# Patient Record
Sex: FEMALE | Race: BLACK OR AFRICAN AMERICAN | NOT HISPANIC OR LATINO | Employment: UNEMPLOYED | URBAN - METROPOLITAN AREA
[De-identification: names, ages, dates, MRNs, and addresses within clinical notes are randomized per-mention and may not be internally consistent; named-entity substitution may affect disease eponyms.]

---

## 2017-01-03 ENCOUNTER — HOSPITAL ENCOUNTER (EMERGENCY)
Facility: OTHER | Age: 37
Discharge: PSYCHIATRIC OR SUBSTANCE USE FACILITY | End: 2017-01-03
Attending: EMERGENCY MEDICINE
Payer: MEDICAID

## 2017-01-03 VITALS
WEIGHT: 140 LBS | SYSTOLIC BLOOD PRESSURE: 127 MMHG | RESPIRATION RATE: 12 BRPM | TEMPERATURE: 98 F | OXYGEN SATURATION: 99 % | HEIGHT: 66 IN | BODY MASS INDEX: 22.5 KG/M2 | HEART RATE: 82 BPM | DIASTOLIC BLOOD PRESSURE: 82 MMHG

## 2017-01-03 DIAGNOSIS — F19.10 POLYSUBSTANCE ABUSE: ICD-10-CM

## 2017-01-03 DIAGNOSIS — F32.A DEPRESSION, UNSPECIFIED DEPRESSION TYPE: ICD-10-CM

## 2017-01-03 DIAGNOSIS — R45.851 SUICIDAL IDEATION: Primary | ICD-10-CM

## 2017-01-03 LAB
ALBUMIN SERPL BCP-MCNC: 4 G/DL
ALP SERPL-CCNC: 107 U/L
ALT SERPL W/O P-5'-P-CCNC: 17 U/L
AMPHET+METHAMPHET UR QL: NEGATIVE
ANION GAP SERPL CALC-SCNC: 12 MMOL/L
APAP SERPL-MCNC: <3 UG/ML
AST SERPL-CCNC: 31 U/L
BACTERIA #/AREA URNS HPF: NORMAL /HPF
BARBITURATES UR QL SCN>200 NG/ML: NEGATIVE
BASOPHILS # BLD AUTO: 0.02 K/UL
BASOPHILS NFR BLD: 0.5 %
BENZODIAZ UR QL SCN>200 NG/ML: NEGATIVE
BILIRUB SERPL-MCNC: 0.2 MG/DL
BILIRUB UR QL STRIP: NEGATIVE
BUN SERPL-MCNC: 18 MG/DL
BZE UR QL SCN: NORMAL
CALCIUM SERPL-MCNC: 9.3 MG/DL
CANNABINOIDS UR QL SCN: NEGATIVE
CHLORIDE SERPL-SCNC: 106 MMOL/L
CLARITY UR: CLEAR
CO2 SERPL-SCNC: 20 MMOL/L
COLOR UR: YELLOW
CREAT SERPL-MCNC: 1 MG/DL
CREAT UR-MCNC: 225.5 MG/DL
DIFFERENTIAL METHOD: ABNORMAL
EOSINOPHIL # BLD AUTO: 0.2 K/UL
EOSINOPHIL NFR BLD: 4.7 %
ERYTHROCYTE [DISTWIDTH] IN BLOOD BY AUTOMATED COUNT: 17 %
EST. GFR  (AFRICAN AMERICAN): >60 ML/MIN/1.73 M^2
EST. GFR  (NON AFRICAN AMERICAN): >60 ML/MIN/1.73 M^2
ETHANOL SERPL-MCNC: <10 MG/DL
GLUCOSE SERPL-MCNC: 69 MG/DL
GLUCOSE UR QL STRIP: NEGATIVE
HCT VFR BLD AUTO: 36.2 %
HGB BLD-MCNC: 11.6 G/DL
HGB UR QL STRIP: NEGATIVE
HYALINE CASTS #/AREA URNS LPF: 0 /LPF
KETONES UR QL STRIP: NEGATIVE
LEUKOCYTE ESTERASE UR QL STRIP: NEGATIVE
LYMPHOCYTES # BLD AUTO: 1.2 K/UL
LYMPHOCYTES NFR BLD: 28.8 %
MCH RBC QN AUTO: 26.7 PG
MCHC RBC AUTO-ENTMCNC: 32 %
MCV RBC AUTO: 83 FL
METHADONE UR QL SCN>300 NG/ML: NEGATIVE
MICROSCOPIC COMMENT: NORMAL
MONOCYTES # BLD AUTO: 0.4 K/UL
MONOCYTES NFR BLD: 9.7 %
NEUTROPHILS # BLD AUTO: 2.3 K/UL
NEUTROPHILS NFR BLD: 56.3 %
NITRITE UR QL STRIP: NEGATIVE
OPIATES UR QL SCN: NORMAL
PCP UR QL SCN>25 NG/ML: NEGATIVE
PH UR STRIP: 6 [PH] (ref 5–8)
PLATELET # BLD AUTO: 166 K/UL
PMV BLD AUTO: 9.8 FL
POCT GLUCOSE: 100 MG/DL (ref 70–110)
POTASSIUM SERPL-SCNC: 3.9 MMOL/L
PROT SERPL-MCNC: 8.8 G/DL
PROT UR QL STRIP: ABNORMAL
RBC # BLD AUTO: 4.34 M/UL
RBC #/AREA URNS HPF: 1 /HPF (ref 0–4)
SODIUM SERPL-SCNC: 138 MMOL/L
SP GR UR STRIP: >=1.03 (ref 1–1.03)
SQUAMOUS #/AREA URNS HPF: 15 /HPF
TOXICOLOGY INFORMATION: NORMAL
TSH SERPL DL<=0.005 MIU/L-ACNC: 1.8 UIU/ML
URN SPEC COLLECT METH UR: ABNORMAL
UROBILINOGEN UR STRIP-ACNC: NEGATIVE EU/DL
WBC # BLD AUTO: 4.03 K/UL
WBC #/AREA URNS HPF: 4 /HPF (ref 0–5)

## 2017-01-03 PROCEDURE — 80053 COMPREHEN METABOLIC PANEL: CPT

## 2017-01-03 PROCEDURE — 80320 DRUG SCREEN QUANTALCOHOLS: CPT

## 2017-01-03 PROCEDURE — 96374 THER/PROPH/DIAG INJ IV PUSH: CPT

## 2017-01-03 PROCEDURE — 25000003 PHARM REV CODE 250: Performed by: EMERGENCY MEDICINE

## 2017-01-03 PROCEDURE — 99285 EMERGENCY DEPT VISIT HI MDM: CPT | Mod: 25

## 2017-01-03 PROCEDURE — 81000 URINALYSIS NONAUTO W/SCOPE: CPT

## 2017-01-03 PROCEDURE — 63600175 PHARM REV CODE 636 W HCPCS: Performed by: EMERGENCY MEDICINE

## 2017-01-03 PROCEDURE — 96372 THER/PROPH/DIAG INJ SC/IM: CPT

## 2017-01-03 PROCEDURE — 82962 GLUCOSE BLOOD TEST: CPT

## 2017-01-03 PROCEDURE — 84443 ASSAY THYROID STIM HORMONE: CPT

## 2017-01-03 PROCEDURE — 25000003 PHARM REV CODE 250: Performed by: PSYCHIATRY & NEUROLOGY

## 2017-01-03 PROCEDURE — 82570 ASSAY OF URINE CREATININE: CPT

## 2017-01-03 PROCEDURE — 85025 COMPLETE CBC W/AUTO DIFF WBC: CPT

## 2017-01-03 PROCEDURE — 80329 ANALGESICS NON-OPIOID 1 OR 2: CPT

## 2017-01-03 RX ORDER — DIPHENHYDRAMINE HYDROCHLORIDE 50 MG/ML
50 INJECTION INTRAMUSCULAR; INTRAVENOUS
Status: COMPLETED | OUTPATIENT
Start: 2017-01-03 | End: 2017-01-03

## 2017-01-03 RX ORDER — LORAZEPAM 2 MG/ML
1 INJECTION INTRAMUSCULAR
Status: COMPLETED | OUTPATIENT
Start: 2017-01-03 | End: 2017-01-03

## 2017-01-03 RX ORDER — HYDROCORTISONE 1 %
CREAM (GRAM) TOPICAL 2 TIMES DAILY
Status: ON HOLD | COMMUNITY
End: 2022-05-29

## 2017-01-03 RX ORDER — SERTRALINE HYDROCHLORIDE 25 MG/1
25 TABLET, FILM COATED ORAL DAILY
Status: DISCONTINUED | OUTPATIENT
Start: 2017-01-03 | End: 2017-01-03 | Stop reason: HOSPADM

## 2017-01-03 RX ORDER — HALOPERIDOL 5 MG/ML
5 INJECTION INTRAMUSCULAR
Status: COMPLETED | OUTPATIENT
Start: 2017-01-03 | End: 2017-01-03

## 2017-01-03 RX ORDER — KETOCONAZOLE 20 MG/G
CREAM TOPICAL DAILY PRN
COMMUNITY

## 2017-01-03 RX ADMIN — DEXTROSE MONOHYDRATE 12.5 G: 25 INJECTION, SOLUTION INTRAVENOUS at 04:01

## 2017-01-03 RX ADMIN — LORAZEPAM 1 MG: 2 INJECTION INTRAMUSCULAR; INTRAVENOUS at 03:01

## 2017-01-03 RX ADMIN — HALOPERIDOL LACTATE 5 MG: 5 INJECTION, SOLUTION INTRAMUSCULAR at 02:01

## 2017-01-03 RX ADMIN — DIPHENHYDRAMINE HYDROCHLORIDE 50 MG: 50 INJECTION, SOLUTION INTRAMUSCULAR; INTRAVENOUS at 03:01

## 2017-01-03 RX ADMIN — SERTRALINE HYDROCHLORIDE 25 MG: 25 TABLET ORAL at 10:01

## 2017-01-03 NOTE — ED PROVIDER NOTES
"Encounter Date: 1/3/2017    SCRIBE #1 NOTE: I, Rosales Felipe, am scribing for, and in the presence of, Dr. Shaver.       History     Chief Complaint   Patient presents with    Altered Mental Status     did crack and heroin for several days and stated she is trying to kill herself     Review of patient's allergies indicates:   Allergen Reactions    Iodinated contrast media - iv dye Hives and Itching     HPI Comments: Time seen by provider: 1:59 AM    This is a 36 y.o. female who presents to the ED per EMS for altered mental status. EMS reports she admitted to 3 days of heroin and crack cocaine use. She states that she wants to hurt herself with overdosing on the heroin and cocaine as she "is tired of living and of people." Hx of HIV reported. She reports she is homeless. The patient's hx is limited by her uncooperativeness and combativeness.     The history is provided by the EMS personnel and the patient.     Past Medical History   Diagnosis Date    AIDS     HIV (human immunodeficiency virus infection)      No past medical history pertinent negatives.  Past Surgical History   Procedure Laterality Date     section      Hysterectomy       History reviewed. No pertinent family history.  Social History   Substance Use Topics    Smoking status: Current Every Day Smoker    Smokeless tobacco: None    Alcohol use No     Review of Systems   Reason unable to perform ROS: Patient is uncooperative.       Physical Exam   Initial Vitals   BP Pulse Resp Temp SpO2   17 0150 17 0150 -- 17 0150 17 0150   137/94 80  97.7 °F (36.5 °C) 99 %     Physical Exam    Nursing note and vitals reviewed.  Constitutional: She appears well-developed and well-nourished. She is not diaphoretic. She appears distressed.   HENT:   Head: Normocephalic and atraumatic.   Right Ear: External ear normal.   Left Ear: External ear normal.   Nose: Nose normal.   Mouth/Throat: Oropharynx is clear and moist.   Eyes: " Conjunctivae and EOM are normal. Pupils are equal, round, and reactive to light. Right eye exhibits no discharge. Left eye exhibits no discharge.   Neck: Normal range of motion.   Cardiovascular: Normal rate, regular rhythm and normal heart sounds. Exam reveals no gallop and no friction rub.    No murmur heard.  Pulmonary/Chest: Breath sounds normal. No respiratory distress. She has no wheezes. She has no rhonchi. She has no rales.   Abdominal: Soft. There is no tenderness. There is no rebound and no guarding.   Musculoskeletal: Normal range of motion. She exhibits no edema or tenderness.   Neurological: She is alert and oriented to person, place, and time.   Skin: Skin is warm and dry. No rash and no abscess noted. No erythema. No pallor.   Psychiatric: Her speech is normal. Her affect is labile. She is agitated and combative. Cognition and memory are normal. She expresses suicidal ideation. She expresses suicidal plans.   Tearful.          ED Course   Critical Care  Date/Time: 1/9/2017 4:06 PM  Performed by: MAGDIEL THOMPSON  Authorized by: MAGDIEL THOMPSON   Direct patient critical care time: 16 minutes  Additional history critical care time: 4 minutes  Ordering / reviewing critical care time: 5 minutes  Documentation critical care time: 5 minutes  Consult with family critical care time: 4 (PEC filing) minutes  Total critical care time (exclusive of procedural time) : 34 minutes  Critical care time was exclusive of separately billable procedures and treating other patients.  Critical care was necessary to treat or prevent imminent or life-threatening deterioration of the following conditions: CNS failure or compromise and toxidrome.  Critical care was time spent personally by me on the following activities: blood draw for specimens, obtaining history from patient or surrogate, pulse oximetry, re-evaluation of patient's condition, ordering and performing treatments and interventions, interpretation of cardiac  output measurements, development of treatment plan with patient or surrogate, evaluation of patient's response to treatment, ordering and review of laboratory studies, review of old charts and examination of patient.        Labs Reviewed   CBC W/ AUTO DIFFERENTIAL - Abnormal; Notable for the following:        Result Value    Hemoglobin 11.6 (*)     Hematocrit 36.2 (*)     MCH 26.7 (*)     RDW 17.0 (*)     All other components within normal limits   COMPREHENSIVE METABOLIC PANEL - Abnormal; Notable for the following:     CO2 20 (*)     Glucose 69 (*)     Total Protein 8.8 (*)     All other components within normal limits   URINALYSIS - Abnormal; Notable for the following:     Specific Gravity, UA >=1.030 (*)     Protein, UA 1+ (*)     All other components within normal limits   ACETAMINOPHEN LEVEL - Abnormal; Notable for the following:     Acetaminophen (Tylenol), Serum <3.0 (*)     All other components within normal limits   TSH   DRUG SCREEN PANEL, URINE EMERGENCY   ALCOHOL,MEDICAL (ETHANOL)   URINALYSIS MICROSCOPIC   POCT GLUCOSE             Medical Decision Making:   ED Management:  Emergent evaluation of 36-year-old female with altered mental status, reported suicidal ideation and depression, history of polysubstance abuse.  On exam she admits to wanting to kill herself, but is uncooperative and agitated.  She reports required chemical and physical restraints.  PEC was filed, and she was observed until sober and medically cleared for evaluation by psychiatry.            Scribe Attestation:   Scribe #1: I performed the above scribed service and the documentation accurately describes the services I performed. I attest to the accuracy of the note.    Attending Attestation:           Physician Attestation for Scribe:  Physician Attestation Statement for Scribe #1: I, Dr. Shaver, reviewed documentation, as scribed by Rosales Wang in my presence, and it is both accurate and complete.                 ED Course      Clinical Impression:     1. Suicidal ideation    2. Polysubstance abuse    3. Depression, unspecified depression type                Bernadine Shaver MD  01/09/17 [id number]

## 2017-01-03 NOTE — ED NOTES
Rounding on the patient has been done. The patient has been updated on the plan of care and current status. Pain was assessed and is currently a 0/10. Comfort positioning and restroom needs were addressed. Necessary items were placed with in reach and was advised when a reassessment would take place. The call bell remains at the bedside for any additional patient needs. The patient is resting comfortably on the stretcher, respirations are even and unlabored, skin warm and dry. Will continue to monitor.

## 2017-01-03 NOTE — PSYCH
DATE OF CONSULTATION:  01/03/2017    IDENTIFICATION DATA:  This is a 36-year-old  -American female who   was brought to ER by EMS after overdose on heroin and suicidal thoughts.  This   consult is requested by Dr. Shaver for psych evaluation.  The patient is on a   PEC status.    CHIEF COMPLAINT:  The patient is foggy and did not answer questions.  She has    with cord around her neck.    HISTORY OF PRESENTING ILLNESS:  According to intake note, the patient had change   in mental status and was using heroin and cocaine.  Her urine was positive for   heroin and cocaine.  She was combative, labile, agitated and was statihng that   she wants to kill herself by overdose on heroin and cocaine.  The patient has   been to outpatient wards in December 2016 after overdose on heroin.  She was in   respiratory distress and required Narcan.  The patient was given Narcan this   time also.  The patient was combative, agitated and was uncooperative.  She   required restraints and safety measures.  She tried to wrap a telephone cord   around her neck.  The patient was given Haldol, Ativan last night.  The patient   is still sedated.  The patient is able to open her eyes.  She has goosebumps at   this time, but no nausea or vomiting.  The patient said yes to use of heroin and   cocaine, but was not able to elaborate.  The patient said yes to Suboxone when   question was asked whether she needs Suboxone.  The patient's chart indicates   that she had IV heroin and cocaine 2 hours prior to arrival, she arrived around   1:30 a.m.  The patient's urine drug screen is positive for cocaine and heroin.    She is homeless and doing prostitution.  During her last visit, staff found   condoms and paraphernalia in her purse.  Her alcohol level was 340 at the time   of last admission.  Her alcohol level at this time is less than 10.  The patient   denies hearing voices or seeing things.  The patient toxic psychosis.  She   has a  conflictual relationship with  who does not allow her to come close   to kids because of her addiction problem and she knows about that.  Her friend   did not answer Dr. Santana's call.  The patient told staff that the last time   that she is not taking her HIV medicine for 2 or 3 years because of her   addiction.  The patient has family problems.  She has financial difficulties and   trouble with housing.    PAST PSYCHIATRIC HISTORY:  Chart indicates that the patient had been to [facility] in the past for her addiction.  She is not on any psychotropic medicine.    She had accidental overdose on heroin in the past.  Little is known about her   rehabilitation and legal issues.    MEDICAL HISTORY:  The patient has HIV infection.  Her blood pressure is 137/94   with 80 pulse.  Her WBC is 4.0, hemoglobin 11.6, hematocrit 36, platelets 166,   MCV 83 and lymphos 28.  Her sodium is 134, potassium 3.9.  Liver enzymes are   normal.    ALLERGIES:  SHE IS ALLERGIC TO IODINE.    She was on Retrovir, Prezista, Zithromax, Bactrim and Percocet.  Her UA is   positive for cocaine and heroin.    MENTAL STATUS EXAMINATION:  This is a 36-year-old tall -American female   who looks disheveled, exhibited poor eye contact.  She is able to open her eyes   and verbalize words, which indicates that the patient is able to comprehend and   communicate.  .  Mood is depressed with sad affect.  She has   a blister on her lip.  No tremors noted.  However, she has goosebumps.  No   nausea or vomiting noted.    She has no intention to harm   herself or others.  She verbalized wanted to kill herself.  She was   communicative.  Insight and judgment are impaired.  She is of average   intelligence person.    PSYCHIATRIC DIAGNOSES:  AXIS I:  Depressive disorder, NOS, cocaine dependence, alcohol dependence.  AXIS II:  No diagnosis.  AXIS III:  HIV.  AXIS IV:  Homeless, alcoholic, family problem, drug problem.  AXIS V:   35.    RECOMMENDATIONS:  1.  We will transfer this patient to [facility] for further   stabilization.  2.  We will initiate Zyprexa 10 mg p.o.   3.  We will restart medicine at the [facility].    PROGNOSIS:  Fair.    ESTIMATED LENGTH OF STAY:  5 days.    CRITERIA FOR DISCHARGE:  The patient will show improvement in depression,   suicidal ideation, and safety     ASSETS:  The patient is verbal, communicative, intelligent and motivated.    /hjr [id number] blank(s)/review Audio muffled, unable to be transcribed.      WILIAM  dd: 01/03/2017 08:58:56 (CST)  td: 01/03/2017 10:53:59 (CST)  Doc ID   #[id number]  Job ID #[id number]    CC: Bernadine Shaver M.D.  [facility]

## 2017-01-03 NOTE — ED NOTES
Pt transferred to room 5 without incident, respirations even and unlabored, arouses to voice, Pt denies pain as well as auditory, and visual hallucinations. Sates she continues to have suicidal thoughts.

## 2017-01-03 NOTE — ED NOTES
Pt resting in bed calmly with eyes closed, RR easy non labored, NAD. Negative further complaints at this time, sitter at bedside, will continue to monitor for changes

## 2017-01-03 NOTE — ED NOTES
Pt lying in bed with eyes closed, respirations even and unlabored, arouses to voice. Pt updated on POC and agrees, sitter posted will continue to monitor.

## 2017-01-03 NOTE — ED NOTES
"Pt presented to ED via [facility] with complaints of SI via OD of heroine and crack cocaine with last use approx 2 hours pta per pt. On arrival pt appears disheveled with dirt covered clothing stating "I been homeless living on the streets and have been having thoughts of killing myself". RR easy non labored, NAD. Negative complaints of pain at this time, AAOX4, pt placed into paper scrubs with sitter at bedside, side rails up x2, call light within reach, bed in lowest locked position, will continue to monitor for changes   "

## 2017-01-03 NOTE — ED NOTES
"Pt becomes verbally abusive to staff stating "I dont want to be here yall arent treating me right", pt informed that we are giving her the best care possible but the pt would have to cooperate with staff to complete PEC process, pt begins to become very loud and aggravated removing phone from wall stating "I just want to leave now", phone removed from pt and removed from room, pt becomes even more combative with staff screaming "let me go now", attempted to deescalate the situation with failure, pt spits at staff at this time. Security called to bedside for assistance, pt is deescalated at this time       "

## 2021-12-29 ENCOUNTER — HOSPITAL ENCOUNTER (EMERGENCY)
Facility: HOSPITAL | Age: 41
Discharge: LEFT BEFORE DISCHARGE | End: 2021-12-29
Attending: EMERGENCY MEDICINE
Payer: MEDICAID

## 2021-12-29 VITALS
SYSTOLIC BLOOD PRESSURE: 132 MMHG | HEART RATE: 88 BPM | WEIGHT: 273 LBS | TEMPERATURE: 99 F | BODY MASS INDEX: 39.08 KG/M2 | DIASTOLIC BLOOD PRESSURE: 86 MMHG | OXYGEN SATURATION: 99 % | RESPIRATION RATE: 16 BRPM | HEIGHT: 70 IN

## 2021-12-29 DIAGNOSIS — Z20.822 LAB TEST NEGATIVE FOR COVID-19 VIRUS: Primary | ICD-10-CM

## 2021-12-29 LAB
CTP QC/QA: YES
INFLUENZA A, MOLECULAR: NEGATIVE
INFLUENZA B, MOLECULAR: NEGATIVE
SARS-COV-2 RDRP RESP QL NAA+PROBE: NEGATIVE
SPECIMEN SOURCE: NORMAL

## 2021-12-29 PROCEDURE — U0002 COVID-19 LAB TEST NON-CDC: HCPCS | Performed by: EMERGENCY MEDICINE

## 2021-12-29 PROCEDURE — 99282 EMERGENCY DEPT VISIT SF MDM: CPT | Mod: 25

## 2021-12-29 PROCEDURE — 87502 INFLUENZA DNA AMP PROBE: CPT | Performed by: EMERGENCY MEDICINE

## 2022-05-28 ENCOUNTER — HOSPITAL ENCOUNTER (OUTPATIENT)
Facility: OTHER | Age: 42
Discharge: HOME | End: 2022-05-30
Attending: EMERGENCY MEDICINE | Admitting: INTERNAL MEDICINE
Payer: MEDICAID

## 2022-05-28 DIAGNOSIS — N39.0 URINARY TRACT INFECTION WITH HEMATURIA, SITE UNSPECIFIED: ICD-10-CM

## 2022-05-28 DIAGNOSIS — R13.10 DYSPHAGIA: Primary | ICD-10-CM

## 2022-05-28 DIAGNOSIS — R31.9 URINARY TRACT INFECTION WITH HEMATURIA, SITE UNSPECIFIED: ICD-10-CM

## 2022-05-28 LAB
ALBUMIN SERPL BCP-MCNC: 3.6 G/DL (ref 3.5–5.2)
ALP SERPL-CCNC: 195 U/L (ref 55–135)
ALT SERPL W/O P-5'-P-CCNC: 65 U/L (ref 10–44)
ANION GAP SERPL CALC-SCNC: 14 MMOL/L (ref 8–16)
AST SERPL-CCNC: 70 U/L (ref 10–40)
BACTERIA #/AREA URNS HPF: ABNORMAL /HPF
BASOPHILS # BLD AUTO: 0.04 K/UL (ref 0–0.2)
BASOPHILS NFR BLD: 0.5 % (ref 0–1.9)
BILIRUB SERPL-MCNC: 0.5 MG/DL (ref 0.1–1)
BILIRUB UR QL STRIP: NEGATIVE
BUN SERPL-MCNC: 13 MG/DL (ref 6–20)
CALCIUM SERPL-MCNC: 9.5 MG/DL (ref 8.7–10.5)
CHLORIDE SERPL-SCNC: 108 MMOL/L (ref 95–110)
CLARITY UR: ABNORMAL
CO2 SERPL-SCNC: 20 MMOL/L (ref 23–29)
COLOR UR: YELLOW
CREAT SERPL-MCNC: 1 MG/DL (ref 0.5–1.4)
CTP QC/QA: YES
DIFFERENTIAL METHOD: ABNORMAL
EOSINOPHIL # BLD AUTO: 0.2 K/UL (ref 0–0.5)
EOSINOPHIL NFR BLD: 2.2 % (ref 0–8)
ERYTHROCYTE [DISTWIDTH] IN BLOOD BY AUTOMATED COUNT: 15.8 % (ref 11.5–14.5)
EST. GFR  (AFRICAN AMERICAN): >60 ML/MIN/1.73 M^2
EST. GFR  (NON AFRICAN AMERICAN): >60 ML/MIN/1.73 M^2
GLUCOSE SERPL-MCNC: 91 MG/DL (ref 70–110)
GLUCOSE UR QL STRIP: NEGATIVE
HCT VFR BLD AUTO: 42.9 % (ref 37–48.5)
HGB BLD-MCNC: 13.9 G/DL (ref 12–16)
HGB UR QL STRIP: ABNORMAL
IMM GRANULOCYTES # BLD AUTO: 0.06 K/UL (ref 0–0.04)
IMM GRANULOCYTES NFR BLD AUTO: 0.8 % (ref 0–0.5)
KETONES UR QL STRIP: NEGATIVE
LEUKOCYTE ESTERASE UR QL STRIP: ABNORMAL
LIPASE SERPL-CCNC: 22 U/L (ref 4–60)
LYMPHOCYTES # BLD AUTO: 3 K/UL (ref 1–4.8)
LYMPHOCYTES NFR BLD: 38.7 % (ref 18–48)
MCH RBC QN AUTO: 30.5 PG (ref 27–31)
MCHC RBC AUTO-ENTMCNC: 32.4 G/DL (ref 32–36)
MCV RBC AUTO: 94 FL (ref 82–98)
MICROSCOPIC COMMENT: ABNORMAL
MONOCYTES # BLD AUTO: 0.8 K/UL (ref 0.3–1)
MONOCYTES NFR BLD: 10 % (ref 4–15)
NEUTROPHILS # BLD AUTO: 3.7 K/UL (ref 1.8–7.7)
NEUTROPHILS NFR BLD: 47.8 % (ref 38–73)
NITRITE UR QL STRIP: POSITIVE
NRBC BLD-RTO: 0 /100 WBC
PH UR STRIP: 8 [PH] (ref 5–8)
PLATELET # BLD AUTO: 234 K/UL (ref 150–450)
PMV BLD AUTO: 9.6 FL (ref 9.2–12.9)
POTASSIUM SERPL-SCNC: 4.7 MMOL/L (ref 3.5–5.1)
PROT SERPL-MCNC: 7.9 G/DL (ref 6–8.4)
PROT UR QL STRIP: NEGATIVE
RBC # BLD AUTO: 4.56 M/UL (ref 4–5.4)
RBC #/AREA URNS HPF: 8 /HPF (ref 0–4)
SARS-COV-2 RDRP RESP QL NAA+PROBE: NEGATIVE
SODIUM SERPL-SCNC: 142 MMOL/L (ref 136–145)
SP GR UR STRIP: 1.02 (ref 1–1.03)
SQUAMOUS #/AREA URNS HPF: 12 /HPF
TSH SERPL DL<=0.005 MIU/L-ACNC: 0.95 UIU/ML (ref 0.4–4)
URN SPEC COLLECT METH UR: ABNORMAL
UROBILINOGEN UR STRIP-ACNC: NEGATIVE EU/DL
WBC # BLD AUTO: 7.78 K/UL (ref 3.9–12.7)
WBC #/AREA URNS HPF: 20 /HPF (ref 0–5)

## 2022-05-28 PROCEDURE — 99285 EMERGENCY DEPT VISIT HI MDM: CPT | Mod: 25

## 2022-05-28 PROCEDURE — 25000003 PHARM REV CODE 250: Performed by: PHYSICIAN ASSISTANT

## 2022-05-28 PROCEDURE — 87186 SC STD MICRODIL/AGAR DIL: CPT | Performed by: PHYSICIAN ASSISTANT

## 2022-05-28 PROCEDURE — 93010 EKG 12-LEAD: ICD-10-PCS | Mod: ,,, | Performed by: INTERNAL MEDICINE

## 2022-05-28 PROCEDURE — U0002 COVID-19 LAB TEST NON-CDC: HCPCS | Performed by: PHYSICIAN ASSISTANT

## 2022-05-28 PROCEDURE — 93005 ELECTROCARDIOGRAM TRACING: CPT

## 2022-05-28 PROCEDURE — G0378 HOSPITAL OBSERVATION PER HR: HCPCS

## 2022-05-28 PROCEDURE — 96361 HYDRATE IV INFUSION ADD-ON: CPT

## 2022-05-28 PROCEDURE — 81000 URINALYSIS NONAUTO W/SCOPE: CPT | Performed by: PHYSICIAN ASSISTANT

## 2022-05-28 PROCEDURE — 80053 COMPREHEN METABOLIC PANEL: CPT | Performed by: PHYSICIAN ASSISTANT

## 2022-05-28 PROCEDURE — 84443 ASSAY THYROID STIM HORMONE: CPT | Performed by: PHYSICIAN ASSISTANT

## 2022-05-28 PROCEDURE — 83690 ASSAY OF LIPASE: CPT | Performed by: PHYSICIAN ASSISTANT

## 2022-05-28 PROCEDURE — 87077 CULTURE AEROBIC IDENTIFY: CPT | Performed by: PHYSICIAN ASSISTANT

## 2022-05-28 PROCEDURE — 93010 ELECTROCARDIOGRAM REPORT: CPT | Mod: ,,, | Performed by: INTERNAL MEDICINE

## 2022-05-28 PROCEDURE — 87088 URINE BACTERIA CULTURE: CPT | Performed by: PHYSICIAN ASSISTANT

## 2022-05-28 PROCEDURE — 96360 HYDRATION IV INFUSION INIT: CPT | Mod: 59

## 2022-05-28 PROCEDURE — 87086 URINE CULTURE/COLONY COUNT: CPT | Performed by: PHYSICIAN ASSISTANT

## 2022-05-28 PROCEDURE — 85025 COMPLETE CBC W/AUTO DIFF WBC: CPT | Performed by: PHYSICIAN ASSISTANT

## 2022-05-28 RX ORDER — SODIUM CHLORIDE 9 MG/ML
INJECTION, SOLUTION INTRAVENOUS CONTINUOUS
Status: DISCONTINUED | OUTPATIENT
Start: 2022-05-28 | End: 2022-05-30 | Stop reason: HOSPADM

## 2022-05-28 RX ORDER — CEPHALEXIN 500 MG/1
500 CAPSULE ORAL
Status: COMPLETED | OUTPATIENT
Start: 2022-05-28 | End: 2022-05-28

## 2022-05-28 RX ADMIN — CEPHALEXIN 500 MG: 500 CAPSULE ORAL at 10:05

## 2022-05-28 RX ADMIN — SODIUM CHLORIDE: 0.9 INJECTION, SOLUTION INTRAVENOUS at 10:05

## 2022-05-29 PROBLEM — R73.03 PREDIABETES: Chronic | Status: ACTIVE | Noted: 2022-05-29

## 2022-05-29 PROBLEM — Z86.718 HISTORY OF DEEP VENOUS THROMBOSIS: Chronic | Status: ACTIVE | Noted: 2017-11-15

## 2022-05-29 PROBLEM — M87.052 AVASCULAR NECROSIS OF BONE OF HIP, LEFT: Chronic | Status: ACTIVE | Noted: 2018-01-29

## 2022-05-29 PROBLEM — E66.01 MORBID OBESITY: Chronic | Status: ACTIVE | Noted: 2022-05-29

## 2022-05-29 PROBLEM — M87.052 AVASCULAR NECROSIS OF BONE OF HIP, LEFT: Status: ACTIVE | Noted: 2018-01-29

## 2022-05-29 PROBLEM — M16.12 PRIMARY OSTEOARTHRITIS OF LEFT HIP: Status: RESOLVED | Noted: 2021-06-27 | Resolved: 2022-05-29

## 2022-05-29 PROBLEM — M16.12 PRIMARY OSTEOARTHRITIS OF LEFT HIP: Status: ACTIVE | Noted: 2021-06-27

## 2022-05-29 PROBLEM — R13.10 DYSPHAGIA: Status: ACTIVE | Noted: 2022-05-29

## 2022-05-29 PROBLEM — R73.03 PREDIABETES: Chronic | Status: RESOLVED | Noted: 2022-05-29 | Resolved: 2022-05-29

## 2022-05-29 PROBLEM — G62.9 NEUROPATHY: Status: ACTIVE | Noted: 2022-05-29

## 2022-05-29 PROBLEM — Z86.718 HISTORY OF DEEP VENOUS THROMBOSIS: Status: ACTIVE | Noted: 2017-11-15

## 2022-05-29 PROBLEM — K21.9 GERD (GASTROESOPHAGEAL REFLUX DISEASE): Status: ACTIVE | Noted: 2022-05-29

## 2022-05-29 LAB
ALBUMIN SERPL BCP-MCNC: 3.3 G/DL (ref 3.5–5.2)
ALP SERPL-CCNC: 161 U/L (ref 55–135)
ALT SERPL W/O P-5'-P-CCNC: 61 U/L (ref 10–44)
AMPHET+METHAMPHET UR QL: NEGATIVE
ANION GAP SERPL CALC-SCNC: 11 MMOL/L (ref 8–16)
AST SERPL-CCNC: 56 U/L (ref 10–40)
BARBITURATES UR QL SCN>200 NG/ML: NEGATIVE
BASOPHILS # BLD AUTO: 0.03 K/UL (ref 0–0.2)
BASOPHILS NFR BLD: 0.4 % (ref 0–1.9)
BENZODIAZ UR QL SCN>200 NG/ML: NEGATIVE
BILIRUB DIRECT SERPL-MCNC: 0.5 MG/DL (ref 0.1–0.3)
BILIRUB SERPL-MCNC: 0.7 MG/DL (ref 0.1–1)
BUN SERPL-MCNC: 14 MG/DL (ref 6–20)
BZE UR QL SCN: NEGATIVE
CALCIUM SERPL-MCNC: 9.2 MG/DL (ref 8.7–10.5)
CANNABINOIDS UR QL SCN: NEGATIVE
CHLORIDE SERPL-SCNC: 107 MMOL/L (ref 95–110)
CO2 SERPL-SCNC: 24 MMOL/L (ref 23–29)
CREAT SERPL-MCNC: 1 MG/DL (ref 0.5–1.4)
CREAT UR-MCNC: 256.5 MG/DL (ref 15–325)
DIFFERENTIAL METHOD: ABNORMAL
EOSINOPHIL # BLD AUTO: 0.3 K/UL (ref 0–0.5)
EOSINOPHIL NFR BLD: 3.6 % (ref 0–8)
ERYTHROCYTE [DISTWIDTH] IN BLOOD BY AUTOMATED COUNT: 15.6 % (ref 11.5–14.5)
EST. GFR  (AFRICAN AMERICAN): >60 ML/MIN/1.73 M^2
EST. GFR  (NON AFRICAN AMERICAN): >60 ML/MIN/1.73 M^2
ETHANOL UR-MCNC: <10 MG/DL
GLUCOSE SERPL-MCNC: 101 MG/DL (ref 70–110)
HCT VFR BLD AUTO: 41.1 % (ref 37–48.5)
HGB BLD-MCNC: 13.4 G/DL (ref 12–16)
IMM GRANULOCYTES # BLD AUTO: 0.04 K/UL (ref 0–0.04)
IMM GRANULOCYTES NFR BLD AUTO: 0.6 % (ref 0–0.5)
LYMPHOCYTES # BLD AUTO: 2.9 K/UL (ref 1–4.8)
LYMPHOCYTES NFR BLD: 40.7 % (ref 18–48)
MAGNESIUM SERPL-MCNC: 1.8 MG/DL (ref 1.6–2.6)
MCH RBC QN AUTO: 30.5 PG (ref 27–31)
MCHC RBC AUTO-ENTMCNC: 32.6 G/DL (ref 32–36)
MCV RBC AUTO: 93 FL (ref 82–98)
METHADONE UR QL SCN>300 NG/ML: NEGATIVE
MONOCYTES # BLD AUTO: 0.8 K/UL (ref 0.3–1)
MONOCYTES NFR BLD: 11.5 % (ref 4–15)
NEUTROPHILS # BLD AUTO: 3.1 K/UL (ref 1.8–7.7)
NEUTROPHILS NFR BLD: 43.2 % (ref 38–73)
NRBC BLD-RTO: 0 /100 WBC
OPIATES UR QL SCN: NEGATIVE
PCP UR QL SCN>25 NG/ML: NEGATIVE
PLATELET # BLD AUTO: 223 K/UL (ref 150–450)
PMV BLD AUTO: 9.9 FL (ref 9.2–12.9)
POTASSIUM SERPL-SCNC: 3.9 MMOL/L (ref 3.5–5.1)
PROT SERPL-MCNC: 7.1 G/DL (ref 6–8.4)
RBC # BLD AUTO: 4.4 M/UL (ref 4–5.4)
SODIUM SERPL-SCNC: 142 MMOL/L (ref 136–145)
TOXICOLOGY INFORMATION: NORMAL
WBC # BLD AUTO: 7.13 K/UL (ref 3.9–12.7)

## 2022-05-29 PROCEDURE — C1751 CATH, INF, PER/CENT/MIDLINE: HCPCS

## 2022-05-29 PROCEDURE — 96375 TX/PRO/DX INJ NEW DRUG ADDON: CPT

## 2022-05-29 PROCEDURE — 25000003 PHARM REV CODE 250: Performed by: INTERNAL MEDICINE

## 2022-05-29 PROCEDURE — A4216 STERILE WATER/SALINE, 10 ML: HCPCS | Performed by: PHYSICIAN ASSISTANT

## 2022-05-29 PROCEDURE — 80307 DRUG TEST PRSMV CHEM ANLYZR: CPT | Performed by: PHYSICIAN ASSISTANT

## 2022-05-29 PROCEDURE — 63600175 PHARM REV CODE 636 W HCPCS: Performed by: PHYSICIAN ASSISTANT

## 2022-05-29 PROCEDURE — 99220 PR INITIAL OBSERVATION CARE,LEVL III: CPT | Mod: ,,, | Performed by: INTERNAL MEDICINE

## 2022-05-29 PROCEDURE — 25000003 PHARM REV CODE 250: Performed by: PHYSICIAN ASSISTANT

## 2022-05-29 PROCEDURE — 96372 THER/PROPH/DIAG INJ SC/IM: CPT | Mod: 59 | Performed by: PHYSICIAN ASSISTANT

## 2022-05-29 PROCEDURE — 85025 COMPLETE CBC W/AUTO DIFF WBC: CPT | Performed by: PHYSICIAN ASSISTANT

## 2022-05-29 PROCEDURE — 25000242 PHARM REV CODE 250 ALT 637 W/ HCPCS: Performed by: INTERNAL MEDICINE

## 2022-05-29 PROCEDURE — 63600175 PHARM REV CODE 636 W HCPCS: Performed by: INTERNAL MEDICINE

## 2022-05-29 PROCEDURE — A4216 STERILE WATER/SALINE, 10 ML: HCPCS | Performed by: INTERNAL MEDICINE

## 2022-05-29 PROCEDURE — 83735 ASSAY OF MAGNESIUM: CPT | Performed by: PHYSICIAN ASSISTANT

## 2022-05-29 PROCEDURE — 80048 BASIC METABOLIC PNL TOTAL CA: CPT | Performed by: PHYSICIAN ASSISTANT

## 2022-05-29 PROCEDURE — 86361 T CELL ABSOLUTE COUNT: CPT | Performed by: PHYSICIAN ASSISTANT

## 2022-05-29 PROCEDURE — 96365 THER/PROPH/DIAG IV INF INIT: CPT

## 2022-05-29 PROCEDURE — 36410 VNPNXR 3YR/> PHY/QHP DX/THER: CPT | Mod: 59

## 2022-05-29 PROCEDURE — G0378 HOSPITAL OBSERVATION PER HR: HCPCS

## 2022-05-29 PROCEDURE — 36410 VNPNXR 3YR/> PHY/QHP DX/THER: CPT

## 2022-05-29 PROCEDURE — 36415 COLL VENOUS BLD VENIPUNCTURE: CPT | Performed by: PHYSICIAN ASSISTANT

## 2022-05-29 PROCEDURE — 99220 PR INITIAL OBSERVATION CARE,LEVL III: ICD-10-PCS | Mod: ,,, | Performed by: INTERNAL MEDICINE

## 2022-05-29 PROCEDURE — 76937 US GUIDE VASCULAR ACCESS: CPT

## 2022-05-29 PROCEDURE — 80076 HEPATIC FUNCTION PANEL: CPT | Performed by: PHYSICIAN ASSISTANT

## 2022-05-29 PROCEDURE — 96376 TX/PRO/DX INJ SAME DRUG ADON: CPT

## 2022-05-29 PROCEDURE — 96361 HYDRATE IV INFUSION ADD-ON: CPT

## 2022-05-29 PROCEDURE — 87536 HIV-1 QUANT&REVRSE TRNSCRPJ: CPT | Performed by: PHYSICIAN ASSISTANT

## 2022-05-29 PROCEDURE — 94761 N-INVAS EAR/PLS OXIMETRY MLT: CPT

## 2022-05-29 RX ORDER — SUCRALFATE 1 G/1
1 TABLET ORAL ONCE
Status: COMPLETED | OUTPATIENT
Start: 2022-05-29 | End: 2022-05-29

## 2022-05-29 RX ORDER — SODIUM CHLORIDE 0.9 % (FLUSH) 0.9 %
10 SYRINGE (ML) INJECTION EVERY 8 HOURS
Status: DISCONTINUED | OUTPATIENT
Start: 2022-05-29 | End: 2022-05-30 | Stop reason: HOSPADM

## 2022-05-29 RX ORDER — FLUTICASONE PROPIONATE 50 MCG
2 SPRAY, SUSPENSION (ML) NASAL DAILY
Status: DISCONTINUED | OUTPATIENT
Start: 2022-05-29 | End: 2022-05-30 | Stop reason: HOSPADM

## 2022-05-29 RX ORDER — DARUNAVIR 800 MG/1
800 TABLET, FILM COATED ORAL
Status: DISCONTINUED | OUTPATIENT
Start: 2022-05-29 | End: 2022-05-30 | Stop reason: HOSPADM

## 2022-05-29 RX ORDER — HYDROXYZINE PAMOATE 25 MG/1
50 CAPSULE ORAL EVERY 8 HOURS PRN
Status: DISCONTINUED | OUTPATIENT
Start: 2022-05-29 | End: 2022-05-30 | Stop reason: HOSPADM

## 2022-05-29 RX ORDER — PANTOPRAZOLE SODIUM 40 MG/1
40 TABLET, DELAYED RELEASE ORAL 2 TIMES DAILY
Status: ON HOLD | COMMUNITY
Start: 2022-05-25 | End: 2022-05-30 | Stop reason: SDUPTHER

## 2022-05-29 RX ORDER — AMOXICILLIN 250 MG
1 CAPSULE ORAL 2 TIMES DAILY PRN
Status: DISCONTINUED | OUTPATIENT
Start: 2022-05-29 | End: 2022-05-30 | Stop reason: HOSPADM

## 2022-05-29 RX ORDER — TALC
6 POWDER (GRAM) TOPICAL NIGHTLY PRN
Status: DISCONTINUED | OUTPATIENT
Start: 2022-05-29 | End: 2022-05-29

## 2022-05-29 RX ORDER — DARUNAVIR 800 MG/1
800 TABLET, FILM COATED ORAL DAILY
COMMUNITY
Start: 2022-05-25

## 2022-05-29 RX ORDER — ELVITEGRAVIR, COBICISTAT, EMTRICITABINE, AND TENOFOVIR ALAFENAMIDE 150; 150; 200; 10 MG/1; MG/1; MG/1; MG/1
1 TABLET ORAL DAILY
COMMUNITY

## 2022-05-29 RX ORDER — ACETAMINOPHEN 325 MG/1
650 TABLET ORAL EVERY 6 HOURS PRN
Status: DISCONTINUED | OUTPATIENT
Start: 2022-05-29 | End: 2022-05-30 | Stop reason: HOSPADM

## 2022-05-29 RX ORDER — NALOXONE HCL 0.4 MG/ML
0.02 VIAL (ML) INJECTION
Status: DISCONTINUED | OUTPATIENT
Start: 2022-05-29 | End: 2022-05-30 | Stop reason: HOSPADM

## 2022-05-29 RX ORDER — METFORMIN HYDROCHLORIDE 500 MG/1
500 TABLET ORAL EVERY MORNING
Status: ON HOLD | COMMUNITY
Start: 2022-05-09 | End: 2022-05-29 | Stop reason: CLARIF

## 2022-05-29 RX ORDER — SUCRALFATE 1 G/10ML
1 SUSPENSION ORAL 4 TIMES DAILY
Status: DISCONTINUED | OUTPATIENT
Start: 2022-05-29 | End: 2022-05-30 | Stop reason: HOSPADM

## 2022-05-29 RX ORDER — BENZONATATE 100 MG/1
100 CAPSULE ORAL 3 TIMES DAILY PRN
Status: DISCONTINUED | OUTPATIENT
Start: 2022-05-29 | End: 2022-05-30 | Stop reason: HOSPADM

## 2022-05-29 RX ORDER — ONDANSETRON 2 MG/ML
4 INJECTION INTRAMUSCULAR; INTRAVENOUS EVERY 6 HOURS PRN
Status: DISCONTINUED | OUTPATIENT
Start: 2022-05-29 | End: 2022-05-30 | Stop reason: HOSPADM

## 2022-05-29 RX ORDER — SODIUM CHLORIDE 0.9 % (FLUSH) 0.9 %
10 SYRINGE (ML) INJECTION
Status: DISCONTINUED | OUTPATIENT
Start: 2022-05-29 | End: 2022-05-29

## 2022-05-29 RX ORDER — PANTOPRAZOLE SODIUM 40 MG/1
40 TABLET, DELAYED RELEASE ORAL 2 TIMES DAILY
Status: DISCONTINUED | OUTPATIENT
Start: 2022-05-29 | End: 2022-05-30 | Stop reason: HOSPADM

## 2022-05-29 RX ORDER — SODIUM CHLORIDE 0.9 % (FLUSH) 0.9 %
10 SYRINGE (ML) INJECTION
Status: DISCONTINUED | OUTPATIENT
Start: 2022-05-29 | End: 2022-05-30 | Stop reason: HOSPADM

## 2022-05-29 RX ORDER — SUCRALFATE 1 G/10ML
1 SUSPENSION ORAL 4 TIMES DAILY
Status: ON HOLD | COMMUNITY
Start: 2022-05-25 | End: 2022-05-30 | Stop reason: SDUPTHER

## 2022-05-29 RX ORDER — SODIUM CHLORIDE 0.9 % (FLUSH) 0.9 %
10 SYRINGE (ML) INJECTION EVERY 6 HOURS
Status: DISCONTINUED | OUTPATIENT
Start: 2022-05-29 | End: 2022-05-30 | Stop reason: HOSPADM

## 2022-05-29 RX ORDER — HEPARIN SODIUM 5000 [USP'U]/ML
5000 INJECTION, SOLUTION INTRAVENOUS; SUBCUTANEOUS EVERY 8 HOURS
Status: DISCONTINUED | OUTPATIENT
Start: 2022-05-29 | End: 2022-05-30 | Stop reason: HOSPADM

## 2022-05-29 RX ADMIN — HEPARIN SODIUM 5000 UNITS: 5000 INJECTION INTRAVENOUS; SUBCUTANEOUS at 05:05

## 2022-05-29 RX ADMIN — Medication 650 MG: at 09:05

## 2022-05-29 RX ADMIN — SODIUM CHLORIDE, PRESERVATIVE FREE 10 ML: 5 INJECTION INTRAVENOUS at 06:05

## 2022-05-29 RX ADMIN — Medication 10 ML: at 02:05

## 2022-05-29 RX ADMIN — SUCRALFATE 1 G: 1 TABLET ORAL at 03:05

## 2022-05-29 RX ADMIN — BENZONATATE 100 MG: 100 CAPSULE ORAL at 10:05

## 2022-05-29 RX ADMIN — ONDANSETRON 4 MG: 2 INJECTION INTRAMUSCULAR; INTRAVENOUS at 07:05

## 2022-05-29 RX ADMIN — Medication 650 MG: at 12:05

## 2022-05-29 RX ADMIN — ONDANSETRON 4 MG: 2 INJECTION INTRAMUSCULAR; INTRAVENOUS at 11:05

## 2022-05-29 RX ADMIN — FLUTICASONE PROPIONATE 100 MCG: 50 SPRAY, METERED NASAL at 12:05

## 2022-05-29 RX ADMIN — SODIUM CHLORIDE: 0.9 INJECTION, SOLUTION INTRAVENOUS at 09:05

## 2022-05-29 RX ADMIN — HEPARIN SODIUM 5000 UNITS: 5000 INJECTION INTRAVENOUS; SUBCUTANEOUS at 09:05

## 2022-05-29 RX ADMIN — SUCRALFATE 1 G: 1 SUSPENSION ORAL at 09:05

## 2022-05-29 RX ADMIN — PANTOPRAZOLE SODIUM 40 MG: 40 TABLET, DELAYED RELEASE ORAL at 09:05

## 2022-05-29 RX ADMIN — SODIUM CHLORIDE, PRESERVATIVE FREE 10 ML: 5 INJECTION INTRAVENOUS at 12:05

## 2022-05-29 RX ADMIN — SUCRALFATE 1 G: 1 SUSPENSION ORAL at 12:05

## 2022-05-29 RX ADMIN — SODIUM CHLORIDE, PRESERVATIVE FREE 10 ML: 5 INJECTION INTRAVENOUS at 11:05

## 2022-05-29 RX ADMIN — CEFTRIAXONE 1 G: 1 INJECTION, SOLUTION INTRAVENOUS at 09:05

## 2022-05-29 RX ADMIN — SUCRALFATE 1 G: 1 SUSPENSION ORAL at 05:05

## 2022-05-29 RX ADMIN — Medication 10 ML: at 09:05

## 2022-05-29 RX ADMIN — DARUNAVIR 800 MG: 800 TABLET, FILM COATED ORAL at 09:05

## 2022-05-29 NOTE — ASSESSMENT & PLAN NOTE
GERD  - Progressive dysphagia in setting of underlying GERD without improvement with pantoprazole and sucralfate.  - Continue pantoprazole 40mg PO BID, sucralfate 1g PO four times daily.  - Denies significant weight loss.  - Potential underlying acquired structural abnormality versus H pylori. Considering barium swallow but will discuss with GI first, if planning for EGD will defer.  - Clear liquids, advance as tolerated.  - Symptomatic management with antiemetics.

## 2022-05-29 NOTE — NURSING
Patient arrived to unit via wheelchair.  Patient VSS on room air. AOx4.  PIV infiltrated, access attempted x 3.  MD notified, PICC consult placed.  Patient assessed, NS on hold until access gained.  Patient is continent x 2.  No complaints or concerns at this time.  Will continue to monitor.

## 2022-05-29 NOTE — ED PROVIDER NOTES
"Source of History:  Patient     Chief complaint:  Abdominal Pain (Upper region abdominal pain with difficulty eating "that is causing me to feel weak," x 2 months. Pt reports "I feel like something is in my throat.")      HPI:  Stephania Quinteros is a 41 y.o. female with HIV (CD4 on 5/25/22 was 474) presenting with progressively worsening dysphagia and globus sensation.  Patient states this is been an issue for the past 6 months.  Has been an intermittent issue up until the last 2 months where dysphagia has become more severe and over the past 2-3 days have progressed unable to keep down liquids.  She states the food will get stuck in her throat and or chest.  She has also notice small blood streaks when regurgitating food over the past couple of days.  She states when this happens she also becomes diaphoretic and has palpitations. She states over the past couple weeks she has emesis immediately after eating or drinking.  She has only tried drinking apple juice today and was unable to tolerate.  She reports pain to her upper abdomen.  No weight loss.  She does report eating hard candies throughout the day.  She reports generalized fatigue, weakness and sensation of something is wrong .  She had appoint with GI in 5/10 but states she left because she was waiting too long in the room and was not seen for 5 hours.  She does close follow-up appointment.  She also had appointment with her infectious disease doctor 3 days ago who she states she was worried about her symptoms  and advised she come to the ER if symptoms worsen.  This is the extent to the patients complaints today here in the emergency department.    ROS: As per HPI and below:  Constitutional:  No fever. No weight loss. + episodic diaphoresis  ENT: + progressive dysphagia  Eyes: No visual loss or changes  Cardiovascular: No chest pain. + intermittent palpitation  Respiratory: No shortness of breath or cough  GI: + upper abdominal pain and vomiting.  No " "melena or coffee-ground emesis  :  No urinary symptoms.  Skin: No rashes or lesions  Musculoskeletal: No arthralgias or myalgias  Neuro: No loss of consciousness, headache or dizziness  Immunology:  Not immunocompromised    Review of patient's allergies indicates:   Allergen Reactions    Iodinated contrast media Hives and Itching       PMH:  As per HPI and below:  Past Medical History:   Diagnosis Date    AIDS     HIV (human immunodeficiency virus infection)      Past Surgical History:   Procedure Laterality Date     SECTION      HYSTERECTOMY         Social History     Tobacco Use    Smoking status: Current Every Day Smoker   Substance Use Topics    Alcohol use: No    Drug use: Yes     Types: IV     Comment: mirror, spoon and syringe        Physical Exam:    /83 (BP Location: Left arm, Patient Position: Sitting)   Pulse 97   Temp 98.1 °F (36.7 °C) (Oral)   Resp 18   Ht 5' 10" (1.778 m)   Wt 126.1 kg (278 lb)   LMP  (LMP Unknown)   SpO2 97%   BMI 39.89 kg/m²   Nurse's notes vitals reviewed  General: Patient alert and oriented well-developed well-nourished.  No distress.  Nontoxic appearing.  ENT: Oropharynx nonerythematous, oral mucosa moist, no thrush.  Eyes: Extraocular muscles are intact, normal conjunctiva, normal sclera  Cardiovascular: Regular rate and rhythm no murmurs gallops or rubs  Respiratory: Clear to auscultation bilaterally without wheezing rhonchi or rales  GI: Soft.  Mild epigastric tenderness.  Nondistended, bowel sounds normal, no guarding, no rebound, on peritoneal  Musculoskeletal: Normocephalic atraumatic  Normal range of motion.  no obvious deformities, no edema, normal cap refill  Skin: Warm and dry no rashes or lesions, no ecchymosis, no erythema  Neuro: alert and oriented x3  Psych:  Normal mood and affect    Labs that have been ordered have been independently reviewed and interpreted by myself.    I decided to obtain the patient's medical records.  Summary of " Medical Records:  Reviewed IUD visit from 05/25.  Dressed GERD.  Strongly encouraged follow-up with GI to receive scope.  Increase PPI to b.i.d. and added Carafate.        MDM:    41 y.o. female with HIV, CD4 greater than 400 who is presenting to emergency department with progressively worsening dysphagia.  Patient is afebrile, nontoxic appearing and hemodynamically stable.  Differential includes esophageal stricture, GERD with esophagitis, candidal esophagitis, esophageal cancer ,achalasia  Concerned that patient has had decreased p.o. intake.  She does not appear severely dehydrated on exam.    ED Course as of 05/28/22 2240   Sat May 28, 2022   2140 21:35  Sinus rhythm at a rate of 80 beats per minute.  No STEMI.  No ischemic changes. [AG]   2201 Comprehensive metabolic panel(!)  Transaminitis, chronic.  Alk phos is 152 to on 05/25 [AG]   2202 Urinalysis, Reflex to Urine Culture Urine, Clean Catch(!)  Nitrite positive with 20 wbc's, contaminated with 12 squamous epithelial cells.  Asymptomatic but will treat given IC state. [AG]   2238 Case discussed with GI, Dr. Valentino.  Agrees with admission, supportive care, IV fluids and will plan for EGD Monday if dysphagia persists.  Discussed with moonlighter, will admit to Dr. Campos.  [AG]      ED Course User Index  [AG] Shukri Wilcox PA-C           Diagnostic Impression:    1. Dysphagia    2. Urinary tract infection with hematuria, site unspecified                  Shukri Wilcox PA-C  05/28/22 2240

## 2022-05-29 NOTE — CONSULTS
Consult Note  Gastroenterology    Consult Requested By: Dr de la cruz  Reason for Consult: Dysphagia    SUBJECTIVE:     History of Present Illness:  Patient is a 41 y.o. female who presents with dysphagia and abdpain. Onset of symptoms was gradual starting 2 months ago with unchanged course since that time. Patient denies bright red blood per rectum and change in bowel habits. Symptoms are aggravated by none. Symptoms improve with none. Past history includes AIDS. Pt does take NSAIDS    Review of patient's allergies indicates:   Allergen Reactions    Iodinated contrast media Hives and Itching       Scheduled Meds:   cefTRIAXone (ROCEPHIN) IVPB  1 g Intravenous Q24H    darunavir ethanolate  800 mg Oral Daily with breakfast    heparin (porcine)  5,000 Units Subcutaneous Q8H    pantoprazole  40 mg Oral BID    sodium chloride 0.9%  10 mL Intravenous Q8H    sodium chloride 0.9%  10 mL Intravenous Q6H    sucralfate  1 g Oral QID       Continuous Infusions:   sodium chloride 0.9% 125 mL/hr at 22 0908       PRN Meds:.  acetaminophen, hydrOXYzine pamoate, naloxone, ondansetron, promethazine (PHENERGAN) IVPB, senna-docusate 8.6-50 mg, Flushing PICC Protocol **AND** sodium chloride 0.9% **AND** sodium chloride 0.9%    Past Medical History:   Diagnosis Date    AIDS     HIV (human immunodeficiency virus infection)        Past Surgical History:   Procedure Laterality Date     SECTION      HYSTERECTOMY         History reviewed. No pertinent family history.    Social History     Socioeconomic History    Marital status: Unknown   Tobacco Use    Smoking status: Current Every Day Smoker     Packs/day: 0.25     Types: Cigarettes    Smokeless tobacco: Never Used   Substance and Sexual Activity    Alcohol use: No    Drug use: Not Currently     Types: IV     Comment: mirror, spoon and syringe, last use 4 yrs ago (patient stated)   Social History Narrative    ** Merged History Encounter **            Review of  Systems:  Constitutional: no fever or chills  Eyes: no visual changes  ENT: no nasal congestion or sore throat  Respiratory: no cough or shorness of breath  Cardiovascular: no chest pain or palpitations  Gastrointestinal: no nausea or vomiting, no abdominal pain or change in bowel habits  Genitourinary: no hematuria or dysuria    OBJECTIVE:   Physical Exam:  General: Well developed, well nourished, no apparent distress  Vital Signs Range (Last 24H):  Temp:  [97.9 °F (36.6 °C)-98.7 °F (37.1 °C)]   Pulse:  [70-97]   Resp:  [18-20]   BP: (105-122)/(65-83)   SpO2:  [94 %-97 %]   HEENT: Anicteric, PERRLA  CVS: S1, S2, no murmers/rubs  Lungs: CTA-B, normal excursion  Abdomen: Soft, NT, ND, normal BS's  Extremities: No c/c/e, 1+ DP pulses to BLE's  Skin: No rashes/lesions.      Laboratory:    CBC:   Recent Labs   Lab 05/29/22  0503   WBC 7.13   RBC 4.40   HGB 13.4   HCT 41.1      MCV 93   MCH 30.5   MCHC 32.6     BMP:   Recent Labs   Lab 05/29/22  0503         K 3.9      CO2 24   BUN 14   CREATININE 1.0   CALCIUM 9.2   MG 1.8     LFTs:   Recent Labs   Lab 05/29/22  0503   ALT 61*   AST 56*   ALKPHOS 161*   BILITOT 0.7   PROT 7.1   ALBUMIN 3.3*     Coagulation: No results for input(s): LABPROT, INR, APTT in the last 168 hours.    Recent Results (from the past 336 hour(s))   CBC with Automated Differential    Collection Time: 05/29/22  5:03 AM   Result Value Ref Range    WBC 7.13 3.90 - 12.70 K/uL    Hemoglobin 13.4 12.0 - 16.0 g/dL    Hematocrit 41.1 37.0 - 48.5 %    Platelets 223 150 - 450 K/uL   CBC auto differential    Collection Time: 05/28/22  9:21 PM   Result Value Ref Range    WBC 7.78 3.90 - 12.70 K/uL    Hemoglobin 13.9 12.0 - 16.0 g/dL    Hematocrit 42.9 37.0 - 48.5 %    Platelets 234 150 - 450 K/uL      No results for input(s): APTT, INR, PTT in the last 168 hours.  Recent Labs   Lab 05/28/22 2121 05/29/22  0503    142   K 4.7 3.9    107   CO2 20* 24   BUN 13 14   CREATININE  1.0 1.0   CALCIUM 9.5 9.2   PROT 7.9 7.1   BILITOT 0.5 0.7   ALKPHOS 195* 161*   ALT 65* 61*   AST 70* 56*    No results found for: HAV, HEPAIGM, HEPBIGM, HEPBCAB, HBEAG, HEPCAB No results found for: AFP   No results found for: CEA       Diagnostic Results:  No Further    ASSESSMENT/PLAN:     1. Dysphagia    2. Urinary tract infection with hematuria, site unspecified        Plan: 1) EGD in AM

## 2022-05-29 NOTE — PLAN OF CARE
LMSW met with the patient at the bedside.     Patient is alert and oriented with no communication barriers.     Prior to admission patient is independent. Patient denies the use of HH or DME.     Patients PCP is Dr. Sonja Ma. Patient choice pharmacy is [facility].    Patient denies having written advance directives.      Patient will transport herself home at discharge.     No CM needs identified at this time.     CM team will continue to follow.          05/29/22 3064   Discharge Assessment   Assessment Type Discharge Planning Assessment   Confirmed/corrected address, phone number and insurance Yes   Confirmed Demographics Correct on Facesheet   Source of Information patient   Communicated MATHIEU with patient/caregiver No   Lives With child(nandini), adult   Do you expect to return to your current living situation? Yes   Do you have help at home or someone to help you manage your care at home? Yes   Prior to hospitilization cognitive status: Alert/Oriented   Current cognitive status: Alert/Oriented   Walking or Climbing Stairs Difficulty none   Dressing/Bathing Difficulty none   Equipment Currently Used at Home none   Readmission within 30 days? No   Patient currently being followed by outpatient case management? No   How do you get to doctors appointments? car, drives self   Discharge Plan A Home

## 2022-05-29 NOTE — HPI
"Ms. Quinteros is a 41/F with PMH HIV, GERD, avascular necrosis of L hip, obesity who presented to [facility] 05/28 with a progressive history of dysphagia and burning chest/abdominal pain for 6 months with 2-3 days of difficulty swallowing liquids. She reports symptoms were initially intermittent and burning in character; she was started on pantoprazole. Symptoms have gradually worsened, however, and despite initiation of sucralfate in addition she has near-constant abdominal and chest burning pain worsened when laying flat or after meals. She has frequent nausea and vomiting in addition, sometimes waking from sleep to vomit. Notes associated globus sensation where "it feels like there's something stuck in [her] chest" and had progressive difficulty with swallowing initially with solids but in the last several days has begun to occur with liquids as well. She had been referred to GI on 5/10 but left after long wait in waiting room. Most recent saw her ID physician at [facility] who prescribed her sucralfate. She reports she has been taking both pantoprazole and sucralfate more than prescribed without effect. With progressive dysphagia to liquids, she presented to ED for further evaluation. ED workup was notable for mild transaminitis, unremarkable CXR, UA with elevated WBCs but many squams. She received cephalexin and hospital medicine was contacted for observation.  "

## 2022-05-29 NOTE — SUBJECTIVE & OBJECTIVE
Past Medical History:   Diagnosis Date    AIDS     HIV (human immunodeficiency virus infection)      Past Surgical History:   Procedure Laterality Date     SECTION      HYSTERECTOMY       Review of patient's allergies indicates:   Allergen Reactions    Iodinated contrast media Hives and Itching       No current facility-administered medications on file prior to encounter.     Current Outpatient Medications on File Prior to Encounter   Medication Sig    darunavir ethanolate (PREZISTA) 800 mg Tab Take 800 mg by mouth once daily.    elviteg-cob-emtri-tenof ALAFEN (GENVOYA) [phone] mg Tab Take 1 tablet by mouth once daily.    hydrOXYzine (VISTARIL) 50 MG Cap Take 50 mg by mouth every 8 (eight) hours as needed.    ketoconazole (NIZORAL) 2 % cream Apply topically daily as needed (as directed).    pantoprazole (PROTONIX) 40 MG tablet Take 40 mg by mouth 2 (two) times a day.    [DISCONTINUED] benazepril (LOTENSIN) 10 MG tablet Take 10 mg by mouth once daily.    [DISCONTINUED] darunavir (PREZISTA) 400 MG Tab Take 800 mg by mouth daily with breakfast.    sucralfate (CARAFATE) 100 mg/mL suspension Take 1 g by mouth 4 (four) times daily.    [DISCONTINUED] azithromycin (ZITHROMAX) 600 MG Tab Take 1,200 mg by mouth every 7 days.    [DISCONTINUED] darunavir-cobicistat (PREZCOBIX) 800-150 mg-mg Tab tablet Take 1 tablet by mouth once daily.    [DISCONTINUED] gabapentin (NEURONTIN) 300 MG capsule Take 300 mg by mouth 3 (three) times daily.     [DISCONTINUED] hydrocortisone 1 % cream Apply topically 2 (two) times daily.    [DISCONTINUED] metFORMIN (GLUCOPHAGE) 500 MG tablet Take 500 mg by mouth every morning.    [DISCONTINUED] raltegravir (ISENTRESS) 400 mg tablet Take 400 mg by mouth 2 (two) times daily.    [DISCONTINUED] ritonavir (NORVIR) 100 mg Cap Take 100 mg by mouth once daily.    [DISCONTINUED] sulfamethoxazole-trimethoprim 400-80mg (BACTRIM,SEPTRA) 400-80 mg per tablet Take 1 tablet by mouth once daily.     [DISCONTINUED] tramadol (ULTRAM) 50 mg tablet Take 50 mg by mouth every 6 (six) hours as needed.    [DISCONTINUED] zidovudine (RETROVIR) 300 mg tablet Take 300 mg by mouth 2 (two) times daily.      Family History    None       Tobacco Use    Smoking status: Current Every Day Smoker     Packs/day: 0.25     Types: Cigarettes    Smokeless tobacco: Never Used   Substance and Sexual Activity    Alcohol use: No    Drug use: Not Currently     Types: IV     Comment: mirror, spoon and syringe, last use 4 yrs ago (patient stated)    Sexual activity: Not on file     Review of Systems   Constitutional:  Positive for activity change, appetite change, chills, diaphoresis and fatigue. Negative for fever.   HENT:  Negative for sore throat and trouble swallowing.    Eyes:  Negative for pain and visual disturbance.   Respiratory:  Negative for cough and shortness of breath.    Cardiovascular:  Positive for chest pain. Negative for palpitations.   Gastrointestinal:  Positive for abdominal pain, nausea and vomiting.   Genitourinary:  Negative for difficulty urinating and dysuria.   Musculoskeletal:  Negative for arthralgias and myalgias.   Skin:  Negative for rash and wound.   Neurological:  Negative for weakness and numbness.   Objective:     Vital Signs (Most Recent):  Temp: 98.3 °F (36.8 °C) (05/29/22 0757)  Pulse: 72 (05/29/22 0800)  Resp: 18 (05/29/22 0757)  BP: 116/77 (05/29/22 0757)  SpO2: 96 % (05/29/22 0757) Vital Signs (24h Range):  Temp:  [97.9 °F (36.6 °C)-98.3 °F (36.8 °C)] 98.3 °F (36.8 °C)  Pulse:  [70-97] 72  Resp:  [18] 18  SpO2:  [95 %-97 %] 96 %  BP: (114-122)/(65-83) 116/77     Weight: 124.5 kg (274 lb 7.6 oz)  Body mass index is 39.38 kg/m².    Physical Exam  Vitals and nursing note reviewed.   Constitutional:       General: She is not in acute distress.     Appearance: She is well-developed. She is obese.   HENT:      Head: Normocephalic and atraumatic.   Eyes:      General:         Right eye: No discharge.          Left eye: No discharge.      Conjunctiva/sclera: Conjunctivae normal.   Cardiovascular:      Rate and Rhythm: Normal rate.      Pulses: Normal pulses.   Pulmonary:      Effort: Pulmonary effort is normal. No respiratory distress.   Abdominal:      Palpations: Abdomen is soft.      Tenderness: There is abdominal tenderness (epigastric).   Musculoskeletal:         General: Normal range of motion.      Right lower leg: No edema.      Left lower leg: No edema.   Skin:     General: Skin is warm and dry.   Neurological:      Mental Status: She is alert and oriented to person, place, and time.     Significant Labs:   CBC:  Recent Labs   Lab 05/28/22 2121 05/29/22  0503   WBC 7.78 7.13   HGB 13.9 13.4   HCT 42.9 41.1    223   GRAN 47.8  3.7 43.2  3.1   LYMPH 38.7  3.0 40.7  2.9   MONO 10.0  0.8 11.5  0.8   EOS 0.2 0.3   BASO 0.04 0.03   CMP:  Recent Labs   Lab 05/28/22 2121 05/29/22  0503    142   K 4.7 3.9    107   CO2 20* 24   BUN 13 14   CREATININE 1.0 1.0   GLU 91 101   CALCIUM 9.5 9.2   MG  --  1.8   ALKPHOS 195*  --    AST 70*  --    ALT 65*  --    BILITOT 0.5  --    PROT 7.9  --    ALBUMIN 3.6  --    ANIONGAP 14 11   UA:  Recent Labs   Lab 05/28/22 2120   PHUR 8.0   SPECGRAV 1.020   PROTEINUA Negative   GLUCUA Negative   KETONESU Negative   BILIRUBINUA Negative   NITRITE Positive*   RBCUA 8*   WBCUA 20*   BACTERIA Many*   SQUAMEPITHEL 12       Significant Imaging:   Imaging Results              X-Ray Chest AP Portable (Final result)  Result time 05/28/22 22:21:11      Final result by Jordon Marks MD (05/28/22 22:21:11)                   Impression:      No acute process.      Electronically signed by: Jordon Marks MD  Date:    05/28/2022  Time:    22:21               Narrative:    EXAMINATION:  XR CHEST AP PORTABLE    CLINICAL HISTORY:  Dysphagia, unspecified    TECHNIQUE:  Single frontal view of the chest was performed.    COMPARISON:  04/10/2014.    FINDINGS:  Monitoring EKG leads are  present.  The trachea is unremarkable.  The cardiomediastinal silhouette is within normal limits.  The hemidiaphragms are unremarkable.  There are no pleural effusions.  There is no evidence of a pneumothorax.  There is no evidence of pneumomediastinum.  No airspace opacity is present.  The osseous structures are unremarkable.

## 2022-05-29 NOTE — ASSESSMENT & PLAN NOTE
- Reports improved; using Nizoral PRN at home.  - Continue hydroxyzine 50mg PO q8hr PRN for itching.

## 2022-05-29 NOTE — PLAN OF CARE
POC reviewed w/ pt. AAOx4. Cardiac monitor maintained. VSS on RA. C/o pain treated w/ PRN pain medication per MAR. C/o nausea treated w/ PRN medication per MAR. Pt voids and shifts in bed independently. No injuries, falls, or trauma occurred during shift. Purposeful rounding completed. Bed low and locked, side rails up x3, call light within reach.

## 2022-05-29 NOTE — ED TRIAGE NOTES
"Pt complains of nausea/ vomiting x 5 months. Pt presents to ED because same issue. "it's been getting worst over the last 4-5 days. Was told to come in by infectious disease MD. Pt complains of epigastric discomfort. Left and right upper abd. Denies lower abd/ flank pain. NAD. Pt report "anything that I eat or drink comes back up. Water, apple juice, oatmeal anything."   "

## 2022-05-29 NOTE — ASSESSMENT & PLAN NOTE
- Continue darunavir 800mg PO daily; Genvoya not formulary, will continue if able to obtain from home.

## 2022-05-29 NOTE — H&P
"[facility] Medicine  History & Physical    Patient Name: Stephania Quinteros  MRN: [id number]  Patient Class: OP- Observation  Admission Date: 5/28/2022  Attending Physician: BLANK Campos MD  Primary Care Provider: Natalie Duran MD     Patient information was obtained from patient, past medical records and ER records.     Subjective:     Principal Problem:Dysphagia    Chief Complaint:   Chief Complaint   Patient presents with    Abdominal Pain     Upper region abdominal pain with difficulty eating "that is causing me to feel weak," x 2 months. Pt reports "I feel like something is in my throat."        HPI: Ms. Quinteros is a 41/F with PMH HIV, GERD, avascular necrosis of L hip, obesity who presented to [facility] 05/28 with a progressive history of dysphagia and burning chest/abdominal pain for 6 months with 2-3 days of difficulty swallowing liquids. She reports symptoms were initially intermittent and burning in character; she was started on pantoprazole. Symptoms have gradually worsened, however, and despite initiation of sucralfate in addition she has near-constant abdominal and chest burning pain worsened when laying flat or after meals. She has frequent nausea and vomiting in addition, sometimes waking from sleep to vomit. Notes associated globus sensation where "it feels like there's something stuck in [her] chest" and had progressive difficulty with swallowing initially with solids but in the last several days has begun to occur with liquids as well. She had been referred to GI on 5/10 but left after long wait in waiting room. Most recent saw her ID physician at [facility] who prescribed her sucralfate. She reports she has been taking both pantoprazole and sucralfate more than prescribed without effect. With progressive dysphagia to liquids, she presented to ED for further evaluation. ED workup was notable for mild transaminitis, unremarkable CXR, UA with elevated WBCs but many squams. " She received cephalexin and hospital medicine was contacted for observation.      Past Medical History:   Diagnosis Date    AIDS     HIV (human immunodeficiency virus infection)      Past Surgical History:   Procedure Laterality Date     SECTION      HYSTERECTOMY       Review of patient's allergies indicates:   Allergen Reactions    Iodinated contrast media Hives and Itching       No current facility-administered medications on file prior to encounter.     Current Outpatient Medications on File Prior to Encounter   Medication Sig    darunavir ethanolate (PREZISTA) 800 mg Tab Take 800 mg by mouth once daily.    elviteg-cob-emtri-tenof ALAFEN (GENVOYA) [phone] mg Tab Take 1 tablet by mouth once daily.    hydrOXYzine (VISTARIL) 50 MG Cap Take 50 mg by mouth every 8 (eight) hours as needed.    ketoconazole (NIZORAL) 2 % cream Apply topically daily as needed (as directed).    pantoprazole (PROTONIX) 40 MG tablet Take 40 mg by mouth 2 (two) times a day.    [DISCONTINUED] benazepril (LOTENSIN) 10 MG tablet Take 10 mg by mouth once daily.    [DISCONTINUED] darunavir (PREZISTA) 400 MG Tab Take 800 mg by mouth daily with breakfast.    sucralfate (CARAFATE) 100 mg/mL suspension Take 1 g by mouth 4 (four) times daily.    [DISCONTINUED] azithromycin (ZITHROMAX) 600 MG Tab Take 1,200 mg by mouth every 7 days.    [DISCONTINUED] darunavir-cobicistat (PREZCOBIX) 800-150 mg-mg Tab tablet Take 1 tablet by mouth once daily.    [DISCONTINUED] gabapentin (NEURONTIN) 300 MG capsule Take 300 mg by mouth 3 (three) times daily.     [DISCONTINUED] hydrocortisone 1 % cream Apply topically 2 (two) times daily.    [DISCONTINUED] metFORMIN (GLUCOPHAGE) 500 MG tablet Take 500 mg by mouth every morning.    [DISCONTINUED] raltegravir (ISENTRESS) 400 mg tablet Take 400 mg by mouth 2 (two) times daily.    [DISCONTINUED] ritonavir (NORVIR) 100 mg Cap Take 100 mg by mouth once daily.    [DISCONTINUED]  sulfamethoxazole-trimethoprim 400-80mg (BACTRIM,SEPTRA) 400-80 mg per tablet Take 1 tablet by mouth once daily.    [DISCONTINUED] tramadol (ULTRAM) 50 mg tablet Take 50 mg by mouth every 6 (six) hours as needed.    [DISCONTINUED] zidovudine (RETROVIR) 300 mg tablet Take 300 mg by mouth 2 (two) times daily.      Family History    None       Tobacco Use    Smoking status: Current Every Day Smoker     Packs/day: 0.25     Types: Cigarettes    Smokeless tobacco: Never Used   Substance and Sexual Activity    Alcohol use: No    Drug use: Not Currently     Types: IV     Comment: mirror, spoon and syringe, last use 4 yrs ago (patient stated)    Sexual activity: Not on file     Review of Systems   Constitutional:  Positive for activity change, appetite change, chills, diaphoresis and fatigue. Negative for fever.   HENT:  Negative for sore throat and trouble swallowing.    Eyes:  Negative for pain and visual disturbance.   Respiratory:  Negative for cough and shortness of breath.    Cardiovascular:  Positive for chest pain. Negative for palpitations.   Gastrointestinal:  Positive for abdominal pain, nausea and vomiting.   Genitourinary:  Negative for difficulty urinating and dysuria.   Musculoskeletal:  Negative for arthralgias and myalgias.   Skin:  Negative for rash and wound.   Neurological:  Negative for weakness and numbness.   Objective:     Vital Signs (Most Recent):  Temp: 98.3 °F (36.8 °C) (05/29/22 0757)  Pulse: 72 (05/29/22 0800)  Resp: 18 (05/29/22 0757)  BP: 116/77 (05/29/22 0757)  SpO2: 96 % (05/29/22 0757) Vital Signs (24h Range):  Temp:  [97.9 °F (36.6 °C)-98.3 °F (36.8 °C)] 98.3 °F (36.8 °C)  Pulse:  [70-97] 72  Resp:  [18] 18  SpO2:  [95 %-97 %] 96 %  BP: (114-122)/(65-83) 116/77     Weight: 124.5 kg (274 lb 7.6 oz)  Body mass index is 39.38 kg/m².    Physical Exam  Vitals and nursing note reviewed.   Constitutional:       General: She is not in acute distress.     Appearance: She is well-developed.  She is obese.   HENT:      Head: Normocephalic and atraumatic.   Eyes:      General:         Right eye: No discharge.         Left eye: No discharge.      Conjunctiva/sclera: Conjunctivae normal.   Cardiovascular:      Rate and Rhythm: Normal rate.      Pulses: Normal pulses.   Pulmonary:      Effort: Pulmonary effort is normal. No respiratory distress.   Abdominal:      Palpations: Abdomen is soft.      Tenderness: There is abdominal tenderness (epigastric).   Musculoskeletal:         General: Normal range of motion.      Right lower leg: No edema.      Left lower leg: No edema.   Skin:     General: Skin is warm and dry.   Neurological:      Mental Status: She is alert and oriented to person, place, and time.     Significant Labs:   CBC:  Recent Labs   Lab 05/28/22 2121 05/29/22  0503   WBC 7.78 7.13   HGB 13.9 13.4   HCT 42.9 41.1    223   GRAN 47.8  3.7 43.2  3.1   LYMPH 38.7  3.0 40.7  2.9   MONO 10.0  0.8 11.5  0.8   EOS 0.2 0.3   BASO 0.04 0.03   CMP:  Recent Labs   Lab 05/28/22 2121 05/29/22  0503    142   K 4.7 3.9    107   CO2 20* 24   BUN 13 14   CREATININE 1.0 1.0   GLU 91 101   CALCIUM 9.5 9.2   MG  --  1.8   ALKPHOS 195*  --    AST 70*  --    ALT 65*  --    BILITOT 0.5  --    PROT 7.9  --    ALBUMIN 3.6  --    ANIONGAP 14 11   UA:  Recent Labs   Lab 05/28/22 2120   PHUR 8.0   SPECGRAV 1.020   PROTEINUA Negative   GLUCUA Negative   KETONESU Negative   BILIRUBINUA Negative   NITRITE Positive*   RBCUA 8*   WBCUA 20*   BACTERIA Many*   SQUAMEPITHEL 12       Significant Imaging:   Imaging Results              X-Ray Chest AP Portable (Final result)  Result time 05/28/22 22:21:11      Final result by Jordon Marks MD (05/28/22 22:21:11)                   Impression:      No acute process.      Electronically signed by: Jordon Marks MD  Date:    05/28/2022  Time:    22:21               Narrative:    EXAMINATION:  XR CHEST AP PORTABLE    CLINICAL HISTORY:  Dysphagia,  unspecified    TECHNIQUE:  Single frontal view of the chest was performed.    COMPARISON:  04/10/2014.    FINDINGS:  Monitoring EKG leads are present.  The trachea is unremarkable.  The cardiomediastinal silhouette is within normal limits.  The hemidiaphragms are unremarkable.  There are no pleural effusions.  There is no evidence of a pneumothorax.  There is no evidence of pneumomediastinum.  No airspace opacity is present.  The osseous structures are unremarkable.                                    Assessment/Plan:     * Dysphagia  GERD  - Progressive dysphagia in setting of underlying GERD without improvement with pantoprazole and sucralfate.  - Continue pantoprazole 40mg PO BID, sucralfate 1g PO four times daily.  - Denies significant weight loss.  - Potential underlying acquired structural abnormality versus H pylori. Considering barium swallow but will discuss with GI first, if planning for EGD will defer.  - Clear liquids, advance as tolerated.  - Symptomatic management with antiemetics.    Seborrheic dermatitis of scalp  - Reports improved; using Nizoral PRN at home.  - Continue hydroxyzine 50mg PO q8hr PRN for itching.    Currently asymptomatic HIV infection, with history of HIV-related illness  - Continue darunavir 800mg PO daily; Genvoya not formulary, will continue if able to obtain from home.    VTE Risk Mitigation (From admission, onward)         Ordered     heparin (porcine) injection 5,000 Units  Every 8 hours         05/29/22 0249     IP VTE HIGH RISK PATIENT  Once         05/29/22 0249     Place sequential compression device  Until discontinued         05/29/22 0249                   BLANK Campos MD  Department of Hospital Medicine   [facility])

## 2022-05-30 ENCOUNTER — ANESTHESIA (OUTPATIENT)
Dept: ENDOSCOPY | Facility: OTHER | Age: 42
End: 2022-05-30
Payer: MEDICAID

## 2022-05-30 ENCOUNTER — ANESTHESIA EVENT (OUTPATIENT)
Dept: ENDOSCOPY | Facility: OTHER | Age: 42
End: 2022-05-30
Payer: MEDICAID

## 2022-05-30 VITALS
WEIGHT: 276 LBS | OXYGEN SATURATION: 98 % | BODY MASS INDEX: 39.51 KG/M2 | RESPIRATION RATE: 20 BRPM | TEMPERATURE: 99 F | HEART RATE: 84 BPM | SYSTOLIC BLOOD PRESSURE: 118 MMHG | DIASTOLIC BLOOD PRESSURE: 84 MMHG | HEIGHT: 70 IN

## 2022-05-30 LAB
ANION GAP SERPL CALC-SCNC: 10 MMOL/L (ref 8–16)
BASOPHILS # BLD AUTO: 0.03 K/UL (ref 0–0.2)
BASOPHILS NFR BLD: 0.5 % (ref 0–1.9)
BUN SERPL-MCNC: 10 MG/DL (ref 6–20)
CALCIUM SERPL-MCNC: 8.5 MG/DL (ref 8.7–10.5)
CD3+CD4+ CELLS # BLD: 543 CELLS/UL (ref 300–1400)
CD3+CD4+ CELLS NFR BLD: 16.2 % (ref 28–57)
CHLORIDE SERPL-SCNC: 111 MMOL/L (ref 95–110)
CO2 SERPL-SCNC: 19 MMOL/L (ref 23–29)
CREAT SERPL-MCNC: 0.9 MG/DL (ref 0.5–1.4)
DIFFERENTIAL METHOD: ABNORMAL
EOSINOPHIL # BLD AUTO: 0.3 K/UL (ref 0–0.5)
EOSINOPHIL NFR BLD: 5.4 % (ref 0–8)
ERYTHROCYTE [DISTWIDTH] IN BLOOD BY AUTOMATED COUNT: 15.4 % (ref 11.5–14.5)
EST. GFR  (AFRICAN AMERICAN): >60 ML/MIN/1.73 M^2
EST. GFR  (NON AFRICAN AMERICAN): >60 ML/MIN/1.73 M^2
GLUCOSE SERPL-MCNC: 77 MG/DL (ref 70–110)
HCT VFR BLD AUTO: 39.4 % (ref 37–48.5)
HGB BLD-MCNC: 12.5 G/DL (ref 12–16)
IMM GRANULOCYTES # BLD AUTO: 0.02 K/UL (ref 0–0.04)
IMM GRANULOCYTES NFR BLD AUTO: 0.4 % (ref 0–0.5)
LYMPHOCYTES # BLD AUTO: 2.3 K/UL (ref 1–4.8)
LYMPHOCYTES NFR BLD: 41.2 % (ref 18–48)
MAGNESIUM SERPL-MCNC: 1.8 MG/DL (ref 1.6–2.6)
MCH RBC QN AUTO: 30 PG (ref 27–31)
MCHC RBC AUTO-ENTMCNC: 31.7 G/DL (ref 32–36)
MCV RBC AUTO: 95 FL (ref 82–98)
MONOCYTES # BLD AUTO: 0.6 K/UL (ref 0.3–1)
MONOCYTES NFR BLD: 9.9 % (ref 4–15)
NEUTROPHILS # BLD AUTO: 2.4 K/UL (ref 1.8–7.7)
NEUTROPHILS NFR BLD: 42.6 % (ref 38–73)
NRBC BLD-RTO: 0 /100 WBC
PLATELET # BLD AUTO: 193 K/UL (ref 150–450)
PMV BLD AUTO: 10 FL (ref 9.2–12.9)
POTASSIUM SERPL-SCNC: 4.1 MMOL/L (ref 3.5–5.1)
RBC # BLD AUTO: 4.16 M/UL (ref 4–5.4)
SODIUM SERPL-SCNC: 140 MMOL/L (ref 136–145)
WBC # BLD AUTO: 5.56 K/UL (ref 3.9–12.7)

## 2022-05-30 PROCEDURE — 43248 EGD GUIDE WIRE INSERTION: CPT | Performed by: INTERNAL MEDICINE

## 2022-05-30 PROCEDURE — A4216 STERILE WATER/SALINE, 10 ML: HCPCS | Performed by: INTERNAL MEDICINE

## 2022-05-30 PROCEDURE — 88342 IMHCHEM/IMCYTCHM 1ST ANTB: CPT | Mod: 26,,, | Performed by: STUDENT IN AN ORGANIZED HEALTH CARE EDUCATION/TRAINING PROGRAM

## 2022-05-30 PROCEDURE — 99224 PR SUBSEQUENT OBSERVATION CARE,LEVEL I: ICD-10-PCS | Mod: ,,, | Performed by: INTERNAL MEDICINE

## 2022-05-30 PROCEDURE — 37000008 HC ANESTHESIA 1ST 15 MINUTES: Performed by: INTERNAL MEDICINE

## 2022-05-30 PROCEDURE — 88305 TISSUE EXAM BY PATHOLOGIST: CPT | Mod: 26,,, | Performed by: STUDENT IN AN ORGANIZED HEALTH CARE EDUCATION/TRAINING PROGRAM

## 2022-05-30 PROCEDURE — 85025 COMPLETE CBC W/AUTO DIFF WBC: CPT | Performed by: PHYSICIAN ASSISTANT

## 2022-05-30 PROCEDURE — 25000003 PHARM REV CODE 250: Performed by: NURSE ANESTHETIST, CERTIFIED REGISTERED

## 2022-05-30 PROCEDURE — 83735 ASSAY OF MAGNESIUM: CPT | Performed by: PHYSICIAN ASSISTANT

## 2022-05-30 PROCEDURE — 88305 TISSUE EXAM BY PATHOLOGIST: CPT | Performed by: STUDENT IN AN ORGANIZED HEALTH CARE EDUCATION/TRAINING PROGRAM

## 2022-05-30 PROCEDURE — 63600175 PHARM REV CODE 636 W HCPCS: Performed by: ANESTHESIOLOGY

## 2022-05-30 PROCEDURE — 25000003 PHARM REV CODE 250: Performed by: PHYSICIAN ASSISTANT

## 2022-05-30 PROCEDURE — 36415 COLL VENOUS BLD VENIPUNCTURE: CPT | Performed by: PHYSICIAN ASSISTANT

## 2022-05-30 PROCEDURE — 88305 TISSUE EXAM BY PATHOLOGIST: ICD-10-PCS | Mod: 26,,, | Performed by: STUDENT IN AN ORGANIZED HEALTH CARE EDUCATION/TRAINING PROGRAM

## 2022-05-30 PROCEDURE — 37000009 HC ANESTHESIA EA ADD 15 MINS: Performed by: INTERNAL MEDICINE

## 2022-05-30 PROCEDURE — 88342 IMHCHEM/IMCYTCHM 1ST ANTB: CPT | Performed by: STUDENT IN AN ORGANIZED HEALTH CARE EDUCATION/TRAINING PROGRAM

## 2022-05-30 PROCEDURE — 25000003 PHARM REV CODE 250: Performed by: INTERNAL MEDICINE

## 2022-05-30 PROCEDURE — G0378 HOSPITAL OBSERVATION PER HR: HCPCS

## 2022-05-30 PROCEDURE — 96372 THER/PROPH/DIAG INJ SC/IM: CPT | Performed by: PHYSICIAN ASSISTANT

## 2022-05-30 PROCEDURE — 80048 BASIC METABOLIC PNL TOTAL CA: CPT | Performed by: PHYSICIAN ASSISTANT

## 2022-05-30 PROCEDURE — 99224 PR SUBSEQUENT OBSERVATION CARE,LEVEL I: CPT | Mod: ,,, | Performed by: INTERNAL MEDICINE

## 2022-05-30 PROCEDURE — 96366 THER/PROPH/DIAG IV INF ADDON: CPT

## 2022-05-30 PROCEDURE — 63600175 PHARM REV CODE 636 W HCPCS: Performed by: INTERNAL MEDICINE

## 2022-05-30 PROCEDURE — 63600175 PHARM REV CODE 636 W HCPCS: Performed by: NURSE ANESTHETIST, CERTIFIED REGISTERED

## 2022-05-30 PROCEDURE — 43239 EGD BIOPSY SINGLE/MULTIPLE: CPT | Mod: 59 | Performed by: INTERNAL MEDICINE

## 2022-05-30 PROCEDURE — 88342 CHG IMMUNOCYTOCHEMISTRY: ICD-10-PCS | Mod: 26,,, | Performed by: STUDENT IN AN ORGANIZED HEALTH CARE EDUCATION/TRAINING PROGRAM

## 2022-05-30 PROCEDURE — A4216 STERILE WATER/SALINE, 10 ML: HCPCS | Performed by: PHYSICIAN ASSISTANT

## 2022-05-30 PROCEDURE — 96361 HYDRATE IV INFUSION ADD-ON: CPT | Mod: 59

## 2022-05-30 RX ORDER — LIDOCAINE HCL/PF 100 MG/5ML
SYRINGE (ML) INTRAVENOUS
Status: DISCONTINUED | OUTPATIENT
Start: 2022-05-30 | End: 2022-05-30

## 2022-05-30 RX ORDER — SUCRALFATE 1 G/10ML
1 SUSPENSION ORAL
Qty: 1200 ML | Refills: 0 | Status: SHIPPED | OUTPATIENT
Start: 2022-05-30

## 2022-05-30 RX ORDER — PROCHLORPERAZINE EDISYLATE 5 MG/ML
5 INJECTION INTRAMUSCULAR; INTRAVENOUS EVERY 30 MIN PRN
Status: DISCONTINUED | OUTPATIENT
Start: 2022-05-30 | End: 2022-05-30 | Stop reason: HOSPADM

## 2022-05-30 RX ORDER — OXYCODONE HYDROCHLORIDE 5 MG/1
5 TABLET ORAL
Status: DISCONTINUED | OUTPATIENT
Start: 2022-05-30 | End: 2022-05-30 | Stop reason: HOSPADM

## 2022-05-30 RX ORDER — PROPOFOL 10 MG/ML
VIAL (ML) INTRAVENOUS
Status: DISCONTINUED | OUTPATIENT
Start: 2022-05-30 | End: 2022-05-30

## 2022-05-30 RX ORDER — CIPROFLOXACIN 500 MG/1
500 TABLET ORAL EVERY 12 HOURS
Qty: 2 TABLET | Refills: 0 | Status: SHIPPED | OUTPATIENT
Start: 2022-05-31

## 2022-05-30 RX ORDER — PANTOPRAZOLE SODIUM 40 MG/1
40 TABLET, DELAYED RELEASE ORAL 2 TIMES DAILY
Qty: 60 TABLET | Refills: 0 | Status: SHIPPED | OUTPATIENT
Start: 2022-05-30

## 2022-05-30 RX ORDER — SODIUM CHLORIDE 0.9 % (FLUSH) 0.9 %
3 SYRINGE (ML) INJECTION
Status: DISCONTINUED | OUTPATIENT
Start: 2022-05-30 | End: 2022-05-30 | Stop reason: HOSPADM

## 2022-05-30 RX ORDER — DIPHENHYDRAMINE HYDROCHLORIDE 50 MG/ML
12.5 INJECTION INTRAMUSCULAR; INTRAVENOUS EVERY 30 MIN PRN
Status: DISCONTINUED | OUTPATIENT
Start: 2022-05-30 | End: 2022-05-30 | Stop reason: HOSPADM

## 2022-05-30 RX ORDER — FLUTICASONE PROPIONATE 50 MCG
2 SPRAY, SUSPENSION (ML) NASAL DAILY
Qty: 9.9 ML | Refills: 0 | Status: SHIPPED | OUTPATIENT
Start: 2022-05-31

## 2022-05-30 RX ORDER — LIDOCAINE HYDROCHLORIDE 20 MG/ML
SOLUTION OROPHARYNGEAL
Qty: 200 ML | Refills: 0 | Status: SHIPPED | OUTPATIENT
Start: 2022-05-30

## 2022-05-30 RX ORDER — HYDROMORPHONE HYDROCHLORIDE 2 MG/ML
0.4 INJECTION, SOLUTION INTRAMUSCULAR; INTRAVENOUS; SUBCUTANEOUS EVERY 5 MIN PRN
Status: DISCONTINUED | OUTPATIENT
Start: 2022-05-30 | End: 2022-05-30 | Stop reason: HOSPADM

## 2022-05-30 RX ORDER — PHENYLEPHRINE HYDROCHLORIDE 10 MG/ML
INJECTION INTRAVENOUS
Status: DISCONTINUED | OUTPATIENT
Start: 2022-05-30 | End: 2022-05-30

## 2022-05-30 RX ORDER — BENZONATATE 100 MG/1
100 CAPSULE ORAL 3 TIMES DAILY PRN
Qty: 30 CAPSULE | Refills: 0 | Status: SHIPPED | OUTPATIENT
Start: 2022-05-30 | End: 2022-06-09

## 2022-05-30 RX ADMIN — FLUTICASONE PROPIONATE 100 MCG: 50 SPRAY, METERED NASAL at 08:05

## 2022-05-30 RX ADMIN — PHENYLEPHRINE HYDROCHLORIDE 100 MCG: 10 INJECTION INTRAVENOUS at 07:05

## 2022-05-30 RX ADMIN — LIDOCAINE HYDROCHLORIDE 60 MG: 20 INJECTION, SOLUTION INTRAVENOUS at 07:05

## 2022-05-30 RX ADMIN — SODIUM CHLORIDE: 0.9 INJECTION, SOLUTION INTRAVENOUS at 02:05

## 2022-05-30 RX ADMIN — PROPOFOL 50 MG: 10 INJECTION, EMULSION INTRAVENOUS at 07:05

## 2022-05-30 RX ADMIN — SODIUM CHLORIDE, PRESERVATIVE FREE 10 ML: 5 INJECTION INTRAVENOUS at 05:05

## 2022-05-30 RX ADMIN — PROPOFOL 75 MG: 10 INJECTION, EMULSION INTRAVENOUS at 07:05

## 2022-05-30 RX ADMIN — SUCRALFATE 1 G: 1 SUSPENSION ORAL at 08:05

## 2022-05-30 RX ADMIN — CEFTRIAXONE 1 G: 1 INJECTION, SOLUTION INTRAVENOUS at 08:05

## 2022-05-30 RX ADMIN — SODIUM CHLORIDE, SODIUM LACTATE, POTASSIUM CHLORIDE, AND CALCIUM CHLORIDE: .6; .31; .03; .02 INJECTION, SOLUTION INTRAVENOUS at 06:05

## 2022-05-30 RX ADMIN — DARUNAVIR 800 MG: 800 TABLET, FILM COATED ORAL at 08:05

## 2022-05-30 RX ADMIN — PROPOFOL 100 MG: 10 INJECTION, EMULSION INTRAVENOUS at 07:05

## 2022-05-30 RX ADMIN — PANTOPRAZOLE SODIUM 40 MG: 40 TABLET, DELAYED RELEASE ORAL at 08:05

## 2022-05-30 RX ADMIN — Medication 10 ML: at 06:05

## 2022-05-30 NOTE — PLAN OF CARE
Patient educated on discharge instructions and verbalized understanding.  All questions answered to patient's satisfaction. Midline removed without complication by RN. Patient walked off unit by LPN.

## 2022-05-30 NOTE — TRANSFER OF CARE
"Anesthesia Transfer of Care Note    Patient: Stephania Quinteros    Procedure(s) Performed: Procedure(s) (LRB):  ESOPHAGOGASTRODUODENOSCOPY (EGD) (N/A)    Patient location: PACU    Anesthesia Type: general    Transport from OR: Transported from OR on 6-10 L/min O2 by face mask with adequate spontaneous ventilation    Post pain: adequate analgesia    Post assessment: no apparent anesthetic complications    Post vital signs: stable    Level of consciousness: awake and alert    Nausea/Vomiting: no nausea/vomiting    Complications: none    Transfer of care protocol was followed      Last vitals:   Visit Vitals  /65 (BP Location: Right arm, Patient Position: Lying)   Pulse 77   Temp 36.5 °C (97.7 °F) (Axillary)   Resp 16   Ht 5' 10" (1.778 m)   Wt 125.2 kg (276 lb 0.3 oz)   LMP  (LMP Unknown)   SpO2 100%   Breastfeeding No   BMI 39.60 kg/m²     "

## 2022-05-30 NOTE — [employer] PATIENT INSTRUCTIONS
Discharge Summary/Instructions after an Endoscopic Procedure  Patient Name: Stephania Quinteros  Patient MRN: [id number]  Patient YOB: 1980  Monday, May 30, 2022  Alden Pulido MD  RESTRICTIONS:  During your procedure today, you received medications for sedation.  These   medications may affect your judgment, balance and coordination.  Therefore,   for 24 hours, you have the following restrictions:   - DO NOT drive a car, operate machinery, make legal/financial decisions,   sign important papers or drink alcohol.    ACTIVITY:  Today: no heavy lifting, straining or running due to procedural   sedation/anesthesia.  The following day: return to full activity including work.  DIET:  Eat and drink normally unless instructed otherwise.     TREATMENT FOR COMMON SIDE EFFECTS:  - Mild abdominal pain, nausea, belching, bloating or excessive gas:  rest,   eat lightly and use a heating pad.  - Sore Throat: treat with throat lozenges and/or gargle with warm salt   water.  - Because air was used during the procedure, expelling large amounts of air   from your rectum or belching is normal.  - If a bowel prep was taken, you may not have a bowel movement for 1-3 days.    This is normal.  SYMPTOMS TO WATCH FOR AND REPORT TO YOUR PHYSICIAN:  1. Abdominal pain or bloating, other than gas cramps.  2. Chest pain.  3. Back pain.  4. Signs of infection such as: chills or fever occurring within 24 hours   after the procedure.  5. Rectal bleeding, which would show as bright red, maroon, or black stools.   (A tablespoon of blood from the rectum is not serious, especially if   hemorrhoids are present.)  6. Vomiting.  7. Weakness or dizziness.  GO DIRECTLY TO THE NEAREST EMERGENCY ROOM IF YOU HAVE ANY OF THE FOLLOWING:      Difficulty breathing              Chills and/or fever over 101 F   Persistent vomiting and/or vomiting blood   Severe abdominal pain   Severe chest pain   Black, tarry stools   Bleeding- more than one  tablespoon   Any other symptom or condition that you feel may need urgent attention  Your doctor recommends these additional instructions:  If any biopsies were taken, your doctors clinic will contact you in 1 to 2   weeks with any results.  - Await pathology results.   - [facility].   - PPI twice daily  - Carafate qid  - Regular diet  For questions, problems or results please call your physician - Alden Pulido MD at Work:  [phone].  [facility], EMERGENCY ROOM PHONE NUMBER: [phone], [facility]   [phone].  IF A COMPLICATION OR EMERGENCY SITUATION ARISES AND YOU ARE UNABLE TO REACH   YOUR PHYSICIAN - GO DIRECTLY TO THE EMERGENCY ROOM.  Alden Pulido MD  5/30/2022 7:31:37 AM  This report has been verified and signed electronically.  Dear patient,  As a result of recent federal legislation (The Federal Cures Act), you may   receive lab or pathology results from your procedure in your [facility]   account before your physician is able to contact you. Your physician or   their representative will relay the results to you with their   recommendations at their soonest availability.  Thank you,  Casey Salazar MD  [employer]

## 2022-05-30 NOTE — ANESTHESIA PREPROCEDURE EVALUATION
05/30/2022  Stephania Quinteros is a 41 y.o., female.      Pre-op Assessment    I have reviewed the Patient Summary Reports.     I have reviewed the Nursing Notes. I have reviewed the NPO Status.   I have reviewed the Medications.     Review of Systems  Anesthesia Hx:  Denies Family Hx of Anesthesia complications.   Denies Personal Hx of Anesthesia complications.   Social:  Smoker Hx polysubstance abuse   Hematology/Oncology:  Hematology Normal   Oncology Normal   Hematology Comments: HIV+, no sx's    EENT/Dental:EENT/Dental Normal   Cardiovascular:  Cardiovascular Normal     Pulmonary:  Pulmonary Normal    Renal/:  Renal/ Normal     Hepatic/GI:   GERD (dysphagia), poorly controlled Elevated LFTs   Musculoskeletal:  Musculoskeletal Normal    Neurological:   Peripheral Neuropathy    Endocrine:  Endocrine Normal  Morbid Obesity / BMI > 40  Dermatological:  Skin Normal    Psych:  Psychiatric Normal           Physical Exam  General: Cooperative, Alert and Oriented    Airway:  Mallampati: I   Mouth Opening: Normal  TM Distance: Normal  Neck ROM: Normal ROM    Dental:  Intact, Braces        Anesthesia Plan  Type of Anesthesia, risks & benefits discussed:    Anesthesia Type: Gen Natural Airway  Intra-op Monitoring Plan: Standard ASA Monitors  Post Op Pain Control Plan: multimodal analgesia  Informed Consent: Informed consent signed with the Patient and all parties understand the risks and agree with anesthesia plan.  All questions answered.   ASA Score: 3    Ready For Surgery From Anesthesia Perspective.     .

## 2022-05-30 NOTE — PLAN OF CARE
Patient states she lives independently at home.     Patient stated she will drive herself home. Patient referral faxed to [facility] for Gastroenterologist appt. CM was unable to schedule patient infectious diease follow-up due to it being a holiday. CM instructed patient to call  infectious diease in the morning for appt date and time. Patient stated will make her own PCP appt     No DC needs from CM perspective.                   05/30/22 1211   Final Note   Assessment Type Final Discharge Note   Anticipated Discharge Disposition Home   Hospital Resources/Appts/Education Provided Provided patient/caregiver with written discharge plan information;Appointments scheduled and added to AVS   Post-Acute Status   Discharge Delays None known at this time

## 2022-05-30 NOTE — ANESTHESIA POSTPROCEDURE EVALUATION
Anesthesia Post Evaluation    Patient: Stephania Quinteros    Procedure(s) Performed: Procedure(s) (LRB):  ESOPHAGOGASTRODUODENOSCOPY (EGD) (N/A)    Final Anesthesia Type: general      Patient location during evaluation: PACU  Patient participation: Yes- Able to Participate  Level of consciousness: awake and alert  Post-procedure vital signs: reviewed and stable  Pain management: adequate  Airway patency: patent    PONV status at discharge: No PONV  Anesthetic complications: no      Cardiovascular status: blood pressure returned to baseline and stable  Respiratory status: unassisted, spontaneous ventilation and room air  Hydration status: euvolemic  Follow-up not needed.          Vitals Value Taken Time   /65 05/30/22 0734   Temp 36.5 °C (97.7 °F) 05/30/22 0730   Pulse 72 05/30/22 0744   Resp 16 05/30/22 0730   SpO2 99 % 05/30/22 0744   Vitals shown include unvalidated device data.      No case tracking events are documented in the log.      Pain/Fredy Score: Pain Rating Prior to Med Admin: 4 (5/29/2022  9:29 PM)  Pain Rating Post Med Admin: 0 (5/29/2022 10:29 PM)  Fredy Score: 8 (5/30/2022  7:30 AM)

## 2022-05-30 NOTE — OR NURSING
Pt without change from previous assessment. Prepared for transfer to inpt room. Report by phone to Katarzyna RN 3CC. Pt placed on transport.

## 2022-05-31 LAB — BACTERIA UR CULT: ABNORMAL

## 2022-05-31 NOTE — HOSPITAL COURSE
Admitted with dysphagia and GERD. GI consulted. Continued pantoprazole and sucralfate. EGD performed demonstrating grade D esophagitis, mild stenosis which was dilated, and 4cm hiatal hernia. Symptoms improved and she tolerated diet without substantial difficulty. Urine culture showed presumptive proteus and she was converted to ciprofloxacin to complete treatment. With clinical improvement and vital stability, she was prepared for discharge home.

## 2022-06-01 LAB — HIV1 RNA # PLAS NAA DL=20: 87 COPIES/ML

## 2022-06-06 LAB
FINAL PATHOLOGIC DIAGNOSIS: NORMAL
GROSS: NORMAL
Lab: NORMAL
SUPPLEMENTAL DIAGNOSIS: NORMAL

## 2022-11-08 ENCOUNTER — HOSPITAL ENCOUNTER (EMERGENCY)
Facility: OTHER | Age: 42
Discharge: HOME | End: 2022-11-08
Attending: EMERGENCY MEDICINE
Payer: MEDICAID

## 2022-11-08 VITALS
RESPIRATION RATE: 18 BRPM | HEIGHT: 71 IN | DIASTOLIC BLOOD PRESSURE: 68 MMHG | OXYGEN SATURATION: 97 % | HEART RATE: 73 BPM | SYSTOLIC BLOOD PRESSURE: 122 MMHG | WEIGHT: 270 LBS | TEMPERATURE: 98 F | BODY MASS INDEX: 37.8 KG/M2

## 2022-11-08 DIAGNOSIS — R10.13 EPIGASTRIC PAIN: Primary | ICD-10-CM

## 2022-11-08 DIAGNOSIS — R11.0 NAUSEA: ICD-10-CM

## 2022-11-08 DIAGNOSIS — R11.2 NAUSEA AND VOMITING, UNSPECIFIED VOMITING TYPE: ICD-10-CM

## 2022-11-08 LAB
ALBUMIN SERPL BCP-MCNC: 3.4 G/DL (ref 3.5–5.2)
ALP SERPL-CCNC: 147 U/L (ref 55–135)
ALT SERPL W/O P-5'-P-CCNC: 38 U/L (ref 10–44)
ANION GAP SERPL CALC-SCNC: 4 MMOL/L (ref 8–16)
AST SERPL-CCNC: 60 U/L (ref 10–40)
BACTERIA #/AREA URNS HPF: ABNORMAL /HPF
BASOPHILS # BLD AUTO: 0.02 K/UL (ref 0–0.2)
BASOPHILS NFR BLD: 0.3 % (ref 0–1.9)
BILIRUB SERPL-MCNC: 0.4 MG/DL (ref 0.1–1)
BILIRUB UR QL STRIP: NEGATIVE
BUN SERPL-MCNC: 17 MG/DL (ref 6–20)
CALCIUM SERPL-MCNC: 8.8 MG/DL (ref 8.7–10.5)
CHLORIDE SERPL-SCNC: 109 MMOL/L (ref 95–110)
CLARITY UR: CLEAR
CO2 SERPL-SCNC: 24 MMOL/L (ref 23–29)
COLOR UR: YELLOW
CREAT SERPL-MCNC: 1.1 MG/DL (ref 0.5–1.4)
DIFFERENTIAL METHOD: ABNORMAL
EOSINOPHIL # BLD AUTO: 0.2 K/UL (ref 0–0.5)
EOSINOPHIL NFR BLD: 3.1 % (ref 0–8)
ERYTHROCYTE [DISTWIDTH] IN BLOOD BY AUTOMATED COUNT: 15 % (ref 11.5–14.5)
EST. GFR  (NO RACE VARIABLE): >60 ML/MIN/1.73 M^2
GLUCOSE SERPL-MCNC: 101 MG/DL (ref 70–110)
GLUCOSE UR QL STRIP: NEGATIVE
HCT VFR BLD AUTO: 39.2 % (ref 37–48.5)
HGB BLD-MCNC: 12.7 G/DL (ref 12–16)
HGB UR QL STRIP: NEGATIVE
IMM GRANULOCYTES # BLD AUTO: 0.02 K/UL (ref 0–0.04)
IMM GRANULOCYTES NFR BLD AUTO: 0.3 % (ref 0–0.5)
KETONES UR QL STRIP: NEGATIVE
LEUKOCYTE ESTERASE UR QL STRIP: ABNORMAL
LIPASE SERPL-CCNC: 33 U/L (ref 4–60)
LYMPHOCYTES # BLD AUTO: 2.7 K/UL (ref 1–4.8)
LYMPHOCYTES NFR BLD: 47.2 % (ref 18–48)
MCH RBC QN AUTO: 29.5 PG (ref 27–31)
MCHC RBC AUTO-ENTMCNC: 32.4 G/DL (ref 32–36)
MCV RBC AUTO: 91 FL (ref 82–98)
MICROSCOPIC COMMENT: ABNORMAL
MONOCYTES # BLD AUTO: 0.5 K/UL (ref 0.3–1)
MONOCYTES NFR BLD: 8.1 % (ref 4–15)
NEUTROPHILS # BLD AUTO: 2.4 K/UL (ref 1.8–7.7)
NEUTROPHILS NFR BLD: 41 % (ref 38–73)
NITRITE UR QL STRIP: NEGATIVE
NRBC BLD-RTO: 0 /100 WBC
PH UR STRIP: 8 [PH] (ref 5–8)
PLATELET # BLD AUTO: 244 K/UL (ref 150–450)
PMV BLD AUTO: 9.7 FL (ref 9.2–12.9)
POTASSIUM SERPL-SCNC: 5.5 MMOL/L (ref 3.5–5.1)
PROT SERPL-MCNC: 7.7 G/DL (ref 6–8.4)
PROT UR QL STRIP: NEGATIVE
RBC # BLD AUTO: 4.31 M/UL (ref 4–5.4)
SODIUM SERPL-SCNC: 137 MMOL/L (ref 136–145)
SP GR UR STRIP: 1.01 (ref 1–1.03)
TRI-PHOS CRY URNS QL MICRO: ABNORMAL
URN SPEC COLLECT METH UR: ABNORMAL
UROBILINOGEN UR STRIP-ACNC: ABNORMAL EU/DL
WBC # BLD AUTO: 5.81 K/UL (ref 3.9–12.7)
WBC #/AREA URNS HPF: 3 /HPF (ref 0–5)

## 2022-11-08 PROCEDURE — 63600175 PHARM REV CODE 636 W HCPCS: Performed by: EMERGENCY MEDICINE

## 2022-11-08 PROCEDURE — 93010 EKG 12-LEAD: ICD-10-PCS | Mod: ,,, | Performed by: INTERNAL MEDICINE

## 2022-11-08 PROCEDURE — 96374 THER/PROPH/DIAG INJ IV PUSH: CPT | Mod: 59

## 2022-11-08 PROCEDURE — 87186 SC STD MICRODIL/AGAR DIL: CPT | Performed by: EMERGENCY MEDICINE

## 2022-11-08 PROCEDURE — 96365 THER/PROPH/DIAG IV INF INIT: CPT

## 2022-11-08 PROCEDURE — 93005 ELECTROCARDIOGRAM TRACING: CPT

## 2022-11-08 PROCEDURE — 25000003 PHARM REV CODE 250: Performed by: EMERGENCY MEDICINE

## 2022-11-08 PROCEDURE — 87086 URINE CULTURE/COLONY COUNT: CPT | Performed by: EMERGENCY MEDICINE

## 2022-11-08 PROCEDURE — 81000 URINALYSIS NONAUTO W/SCOPE: CPT | Performed by: EMERGENCY MEDICINE

## 2022-11-08 PROCEDURE — 96361 HYDRATE IV INFUSION ADD-ON: CPT

## 2022-11-08 PROCEDURE — 85025 COMPLETE CBC W/AUTO DIFF WBC: CPT | Performed by: EMERGENCY MEDICINE

## 2022-11-08 PROCEDURE — 80053 COMPREHEN METABOLIC PANEL: CPT | Performed by: EMERGENCY MEDICINE

## 2022-11-08 PROCEDURE — 83690 ASSAY OF LIPASE: CPT | Performed by: EMERGENCY MEDICINE

## 2022-11-08 PROCEDURE — 87088 URINE BACTERIA CULTURE: CPT | Performed by: EMERGENCY MEDICINE

## 2022-11-08 PROCEDURE — 99284 EMERGENCY DEPT VISIT MOD MDM: CPT | Mod: 25

## 2022-11-08 PROCEDURE — 87077 CULTURE AEROBIC IDENTIFY: CPT | Performed by: EMERGENCY MEDICINE

## 2022-11-08 PROCEDURE — 93010 ELECTROCARDIOGRAM REPORT: CPT | Mod: ,,, | Performed by: INTERNAL MEDICINE

## 2022-11-08 RX ORDER — HALOPERIDOL 5 MG/ML
5 INJECTION INTRAMUSCULAR
Status: COMPLETED | OUTPATIENT
Start: 2022-11-08 | End: 2022-11-08

## 2022-11-08 RX ORDER — PROMETHAZINE HYDROCHLORIDE 25 MG/1
25 TABLET ORAL EVERY 6 HOURS PRN
Qty: 15 TABLET | Refills: 0 | Status: SHIPPED | OUTPATIENT
Start: 2022-11-08

## 2022-11-08 RX ADMIN — PROMETHAZINE HYDROCHLORIDE 25 MG: 25 INJECTION INTRAMUSCULAR; INTRAVENOUS at 06:11

## 2022-11-08 RX ADMIN — HALOPERIDOL LACTATE 5 MG: 5 INJECTION, SOLUTION INTRAMUSCULAR at 09:11

## 2022-11-08 RX ADMIN — SODIUM CHLORIDE 1000 ML: 0.9 INJECTION, SOLUTION INTRAVENOUS at 06:11

## 2022-11-08 NOTE — Clinical Note
"Stephania Carsonparis" Aldair was seen and treated in our emergency department on 11/8/2022.  She may return to work on 11/09/2022.       If you have any questions or concerns, please don't hesitate to call.      ALFA Waters RN    "

## 2022-11-08 NOTE — ED NOTES
Pt rounding complete.  Patient resting in bed.  Restroom and comfort needs addressed.  Pt updated on plan of care.  Call light within reach. In no acute distress at this time. Will continue to monitor.

## 2022-11-08 NOTE — ED NOTES
Pt rounding complete.  Patient sleeping in bed. Restroom and comfort needs addressed. Vitals stable. In no acute distress at this time.  Call light within reach.  Will continue to monitor.

## 2022-11-08 NOTE — ED NOTES
Pt rounding complete.  Patient sleeping in bed. In no acute distress at this time.  Restroom and comfort needs addressed. Call light within reach.  Will continue to monitor.

## 2022-11-08 NOTE — DISCHARGE INSTRUCTIONS
We have prescribed you medication for nausea. Please fill and take as directed.    Please return to the ER if you have chest pain, difficulty breathing, fevers, altered mental status, dizziness, weakness, or any other concerns.      Follow up with your primary care physician.

## 2022-11-08 NOTE — ED NOTES
Pt rounding complete.  Patient reports nausea and vomiting. +stomach pain only when vomiting. Cardiac: WDL, Respiratory: WDL, Neuro: aaox4. Restroom and comfort needs addressed.  Pt updated on plan of care.  Call light within reach.  Will continue to monitor.

## 2022-11-08 NOTE — ED PROVIDER NOTES
Encounter Date: 2022       History     Chief Complaint   Patient presents with    Abdominal Pain     Seen by physician at 6:15AM:    Patient is a 42-year-old female who presents to the emergency department with epigastric pain along with nausea/vomiting.  At baseline, patient does have chronic nausea/vomiting, occurring daily for approximately the past year.  Her symptoms have progressively worsened over the past couple weeks.  She now has traces of blood in her emesis.  She has associated generalized weakness and fatigue.  She denies any diarrhea.  She denies fever/chills.  Denies any urinary symptoms.  Denies any dizziness.  Denies any urinary symptoms.  Patient has an underlying diagnosis of chronic GERD and esophagitis.  She did have an EGD about 2 weeks ago.  She also has a known hiatal hernia.    Review of patient's allergies indicates:   Allergen Reactions    Iodinated contrast media Hives and Itching     Past Medical History:   Diagnosis Date    AIDS     HIV (human immunodeficiency virus infection)      Past Surgical History:   Procedure Laterality Date     SECTION      ESOPHAGOGASTRODUODENOSCOPY N/A 2022    Procedure: ESOPHAGOGASTRODUODENOSCOPY (EGD);  Surgeon: Zahira Jiang MD;  Location: [facility];  Service: Endoscopy;  Laterality: N/A;    HYSTERECTOMY       History reviewed. No pertinent family history.  Social History     Tobacco Use    Smoking status: Every Day     Packs/day: 0.25     Types: Cigarettes    Smokeless tobacco: Never   Substance Use Topics    Alcohol use: No    Drug use: Not Currently     Types: IV     Comment: mirror, spoon and syringe, last use 4 yrs ago (patient stated)     Review of Systems   Constitutional:  Negative for chills and fever.   HENT:  Negative for congestion and rhinorrhea.    Respiratory:  Negative for chest tightness and shortness of breath.    Cardiovascular:  Negative for chest pain and palpitations.   Gastrointestinal:  Positive for abdominal  pain, nausea and vomiting. Negative for diarrhea.   Genitourinary:  Negative for dysuria and flank pain.   Musculoskeletal:  Negative for back pain and neck pain.   Skin:  Negative for color change and wound.   Neurological:  Negative for dizziness and headaches.     Physical Exam     Initial Vitals [11/08/22 0515]   BP Pulse Resp Temp SpO2   138/83 83 14 98.1 °F (36.7 °C) 97 %      MAP       --         Physical Exam    Nursing note and vitals reviewed.  Constitutional: She appears well-developed and well-nourished.   HENT:   Head: Normocephalic and atraumatic.   Eyes: Conjunctivae are normal.   Neck: Neck supple.   Normal range of motion.  Cardiovascular:  Normal rate, regular rhythm and normal heart sounds.           Pulmonary/Chest: Breath sounds normal. No respiratory distress. She has no wheezes. She has no rales.   Abdominal: Abdomen is soft. Bowel sounds are normal. She exhibits no distension. There is abdominal tenderness.   Mild epigastric tenderness. There is no rebound.   Musculoskeletal:         General: Normal range of motion.      Cervical back: Normal range of motion and neck supple.     Neurological: She is alert and oriented to person, place, and time.   Skin: Skin is warm and dry. Capillary refill takes less than 2 seconds.       ED Course   Procedures  Labs Reviewed   COMPREHENSIVE METABOLIC PANEL - Abnormal; Notable for the following components:       Result Value    Potassium 5.5 (*)     Albumin 3.4 (*)     Alkaline Phosphatase 147 (*)     AST 60 (*)     Anion Gap 4 (*)     All other components within normal limits   URINALYSIS, REFLEX TO URINE CULTURE - Abnormal; Notable for the following components:    Urobilinogen, UA 2.0-3.0 (*)     Leukocytes, UA 1+ (*)     All other components within normal limits    Narrative:     Specimen Source->Urine   URINALYSIS MICROSCOPIC - Abnormal; Notable for the following components:    Bacteria Many (*)     All other components within normal limits     Narrative:     Specimen Source->Urine   CBC W/ AUTO DIFFERENTIAL - Abnormal; Notable for the following components:    RDW 15.0 (*)     All other components within normal limits   CULTURE, URINE   CULTURE, URINE   LIPASE     EKG Readings: (Independently Interpreted)   9:29AM:  Rate of 71.  Normal sinus rhythm.  Normal axis.  Normal intervals.  No ST or ischemic changes.       Imaging Results    None              Medications   sodium chloride 0.9% bolus 1,000 mL (0 mLs Intravenous Stopped 11/8/22 1201)   promethazine (PHENERGAN) 25 mg in dextrose 5 % 50 mL IVPB (0 mg Intravenous Stopped 11/8/22 4374)   haloperidol lactate injection 5 mg (5 mg Intravenous Given 11/8/22 4870)     Medical Decision Making:   History:   Old Medical Records: I decided to obtain old medical records.  Old Records Summarized: records from clinic visits.  Initial Assessment:   6:15AM:  Patient is a 42-year-old female who presents to the emergency department with abdominal pain and nausea/vomiting.  Patient appears well, nontoxic.  She has had similar symptoms in the past and currently being followed at [facility].  Given her ongoing workup and recent EGD, I do not feel that imaging is warranted at this time.  Will plan for labs, antiemetics, IV fluids, will continue to follow and reassess.  Independently Interpreted Test(s):   I have ordered and independently interpreted EKG Reading(s) - see prior notes  Clinical Tests:   Lab Tests: Ordered and Reviewed  Medical Tests: Ordered and Reviewed     8:52 AM:  Patient doing well, remains stable.  However she still remains nauseated.  Will continue with additional medication.  Will continue to follow.    11:43 AM:  Pt doing well, feeeling better.  She has not had any further episodes of vomiting and feels ready to be discharged.  Her labs are otherwise unremarkable with no acute findings.  Will plan to prescribe Phenergan to take as needed at home.  I do not feel that further work up in the ED is indicated at  this time.  I updated pt regarding results and I counseled pt regarding supportive care measures.  I have discussed with the pt ED return warnings and need for close PCP f/u.  Pt agreeable to plan and all questions answered.  I feel that pt is stable for discharge and management as an outpatient and no further intervention is needed at this time.  Pt is comfortable returning to the ED if needed.  Will DC home in stable condition.                         Clinical Impression:   Final diagnoses:  [R11.0] Nausea  [R10.13] Epigastric pain (Primary)  [R11.2] Nausea and vomiting, unspecified vomiting type      ED Disposition Condition    Discharge Stable          ED Prescriptions       Medication Sig Dispense Start Date End Date Auth. Provider    promethazine (PHENERGAN) 25 MG tablet Take 1 tablet (25 mg total) by mouth every 6 (six) hours as needed for Nausea. 15 tablet 11/8/2022 -- Milana Stubbs MD          Follow-up Information       Follow up With Specialties Details Why Contact Info    Natalie Duran MD Infectious Diseases   [address] [facility] [address]  [phone]               Milana Stubbs MD  11/08/22 1257

## 2022-11-08 NOTE — ED TRIAGE NOTES
Pt reports to ED with abdominal pain, nausea, and vomiting that has been going on for several days. She reports chest pain/ epigastric pain and states she saw blood in her vomit earlier today. She is aaox4. In no acute distress at this time.

## 2022-11-10 LAB — BACTERIA UR CULT: ABNORMAL

## 2023-08-26 NOTE — DISCHARGE SUMMARY
"[facility] Medicine  Discharge Summary      Patient Name: Stephania Quinteros  MRN: [id number]  Patient Class: OP- Observation  Admission Date: 5/28/2022  Hospital Length of Stay: 0 days  Discharge Date and Time: 5/30/2022  1:36 PM  Attending Physician: No att. providers found   Discharging Provider: BLANK Campos MD  Primary Care Provider: Natalie Duran MD      HPI:   Ms. Quinteros is a 41/F with PMH HIV, GERD, avascular necrosis of L hip, obesity who presented to [facility] 05/28 with a progressive history of dysphagia and burning chest/abdominal pain for 6 months with 2-3 days of difficulty swallowing liquids. She reports symptoms were initially intermittent and burning in character; she was started on pantoprazole. Symptoms have gradually worsened, however, and despite initiation of sucralfate in addition she has near-constant abdominal and chest burning pain worsened when laying flat or after meals. She has frequent nausea and vomiting in addition, sometimes waking from sleep to vomit. Notes associated globus sensation where "it feels like there's something stuck in [her] chest" and had progressive difficulty with swallowing initially with solids but in the last several days has begun to occur with liquids as well. She had been referred to GI on 5/10 but left after long wait in waiting room. Most recent saw her ID physician at [facility] who prescribed her sucralfate. She reports she has been taking both pantoprazole and sucralfate more than prescribed without effect. With progressive dysphagia to liquids, she presented to ED for further evaluation. ED workup was notable for mild transaminitis, unremarkable CXR, UA with elevated WBCs but many squams. She received cephalexin and hospital medicine was contacted for observation.      Procedure(s) (LRB):  ESOPHAGOGASTRODUODENOSCOPY (EGD) (N/A)      Hospital Course:   Admitted with dysphagia and GERD. GI consulted. Continued pantoprazole and " sucralfate. EGD performed demonstrating grade D esophagitis, mild stenosis which was dilated, and 4cm hiatal hernia. Symptoms improved and she tolerated diet without substantial difficulty. Urine culture showed presumptive proteus and she was converted to ciprofloxacin to complete treatment. With clinical improvement and vital stability, she was prepared for discharge home.       Goals of Care Treatment Preferences:  Code Status: Full Code      Consults:     No new Assessment & Plan notes have been filed under this hospital service since the last note was generated.  Service: Hospital Medicine    Final Active Diagnoses:    Diagnosis Date Noted POA    PRINCIPAL PROBLEM:  Dysphagia [R13.10] 05/29/2022 Yes    Morbid obesity [E66.01] 05/29/2022 Yes     Chronic    GERD (gastroesophageal reflux disease) [K21.9] 05/29/2022 Yes    Currently asymptomatic HIV infection, with history of HIV-related illness [B20] 04/10/2014 Yes    Seborrheic dermatitis of scalp [L21.9] 03/07/2013 Yes      Problems Resolved During this Admission:    Diagnosis Date Noted Date Resolved POA    Dyspnea [R06.00] 04/11/2014 05/29/2022 Yes       Discharged Condition: good    Disposition: Home or Self Care    Follow Up:   Follow-up Information     Natalie Duran MD Follow up in 2 week(s).    Specialty: Infectious Diseases  Why: post-hospital follow-up  Contact information:  [address] [facility] [address]  [phone]             [facility] - Gastroenterology Follow up in 2 week(s).    Specialty: Gastroenterology  Why: follow-up on esophagitis and stricture  Contact information:  [address] [facility] [address]  [phone]                       Patient Instructions:      Ambulatory referral/consult to Gastroenterology   Standing Status: Future   Referral Priority: Routine Referral Type: Consultation   Referral Reason: Specialty Services Required   Requested Specialty: Gastroenterology   Number  of Visits Requested: 1     Notify your health care provider if you experience any of the following:  persistent nausea and vomiting or diarrhea     Notify your health care provider if you experience any of the following:  severe uncontrolled pain     Activity as tolerated       Significant Diagnostic Studies:   CBC:  Recent Labs   Lab 05/28/22 2121 05/29/22 0503 05/30/22  0450   WBC 7.78 7.13 5.56   HGB 13.9 13.4 12.5   HCT 42.9 41.1 39.4    223 193   GRAN 47.8  3.7 43.2  3.1 42.6  2.4   LYMPH 38.7  3.0 40.7  2.9 41.2  2.3   MONO 10.0  0.8 11.5  0.8 9.9  0.6   EOS 0.2 0.3 0.3   BASO 0.04 0.03 0.03     CMP:  Recent Labs   Lab 05/28/22 2121 05/29/22 0503 05/30/22  0450    142 140   K 4.7 3.9 4.1    107 111*   CO2 20* 24 19*   BUN 13 14 10   CREATININE 1.0 1.0 0.9   GLU 91 101 77   CALCIUM 9.5 9.2 8.5*   MG  --  1.8 1.8   ALKPHOS 195* 161*  --    AST 70* 56*  --    ALT 65* 61*  --    BILITOT 0.5 0.7  --    PROT 7.9 7.1  --    ALBUMIN 3.6 3.3*  --    ANIONGAP 14 11 10     EGD 05/30:  Impression:            - LA Grade D reflux esophagitis.                          - 4 cm hiatal hernia.                          - Benign-appearing esophageal stenosis. Dilated.                          - Erythematous mucosa in the antrum. Biopsied.                          - Normal examined duodenum.     Pending Diagnostic Studies:     Procedure Component Value Units Date/Time    HIV RNA, quantitative, PCR [[id number]] Collected: 05/29/22 0503    Order Status: Sent Lab Status: In process Updated: 05/29/22 0845    Specimen: Blood     Specimen to Pathology, Surgery Gastrointestinal tract [[id number]] Collected: 05/30/22 0736    Order Status: Sent Lab Status: In process Updated: 05/30/22 1033         Medications:  Reconciled Home Medications:      Medication List      START taking these medications    benzonatate 100 MG capsule  Commonly known as: TESSALON  Take 1 capsule (100 mg total) by mouth 3 (three) times  daily as needed for Cough.     ciprofloxacin HCl 500 MG tablet  Commonly known as: CIPRO  Take 1 tablet (500 mg total) by mouth every 12 (twelve) hours.  Start taking on: May 31, 2022     fluticasone propionate 50 mcg/actuation nasal spray  Commonly known as: FLONASE  2 sprays (100 mcg total) by Each Nostril route once daily.  Start taking on: May 31, 2022     LIDOcaine HCl 2% 2 % Soln  Commonly known as: LIDOcaine VISCOUS  Swish and spit 10 mLs orally every 4 (four) hours as needed for throat pain.        CHANGE how you take these medications    sucralfate 100 mg/mL suspension  Commonly known as: CARAFATE  Take 10 mLs (1 g total) by mouth 4 (four) times daily before meals and nightly.  What changed: when to take this        CONTINUE taking these medications    darunavir ethanolate 800 mg Tab  Commonly known as: PREZISTA  Take 800 mg by mouth once daily.     GENVOYA [phone] mg Tab  Generic drug: elviteg-cob-emtri-tenof ALAFEN  Take 1 tablet by mouth once daily.     hydrOXYzine pamoate 50 MG Cap  Commonly known as: VISTARIL  Take 50 mg by mouth every 8 (eight) hours as needed.     ketoconazole 2 % cream  Commonly known as: NIZORAL  Apply topically daily as needed (as directed).     pantoprazole 40 MG tablet  Commonly known as: PROTONIX  Take 1 tablet (40 mg total) by mouth 2 (two) times a day.        STOP taking these medications    hydrocortisone 1 % cream            Indwelling Lines/Drains at time of discharge:   Lines/Drains/Airways     None                 Time spent on the discharge of patient: 35 minutes         D Jona Campos MD  Department of Hospital Medicine  [facility]   Opt out

## 2024-02-04 ENCOUNTER — HOSPITAL ENCOUNTER (EMERGENCY)
Facility: HOSPITAL | Age: 44
Discharge: HOME | End: 2024-02-04
Attending: EMERGENCY MEDICINE
Payer: MEDICAID

## 2024-02-04 VITALS
RESPIRATION RATE: 20 BRPM | BODY MASS INDEX: 35.42 KG/M2 | WEIGHT: 253 LBS | TEMPERATURE: 98 F | SYSTOLIC BLOOD PRESSURE: 127 MMHG | DIASTOLIC BLOOD PRESSURE: 81 MMHG | HEIGHT: 71 IN | HEART RATE: 76 BPM | OXYGEN SATURATION: 98 %

## 2024-02-04 DIAGNOSIS — R07.9 CHEST PAIN: ICD-10-CM

## 2024-02-04 LAB
ALBUMIN SERPL BCP-MCNC: 4.1 G/DL (ref 3.5–5.2)
ALP SERPL-CCNC: 206 U/L (ref 55–135)
ALT SERPL W/O P-5'-P-CCNC: 49 U/L (ref 10–44)
ANION GAP SERPL CALC-SCNC: 6 MMOL/L (ref 8–16)
AST SERPL-CCNC: 50 U/L (ref 10–40)
BASOPHILS # BLD AUTO: 0.04 K/UL (ref 0–0.2)
BASOPHILS NFR BLD: 0.9 % (ref 0–1.9)
BILIRUB SERPL-MCNC: 0.3 MG/DL (ref 0.1–1)
BUN SERPL-MCNC: 21 MG/DL (ref 6–20)
CALCIUM SERPL-MCNC: 9.6 MG/DL (ref 8.7–10.5)
CHLORIDE SERPL-SCNC: 105 MMOL/L (ref 95–110)
CO2 SERPL-SCNC: 27 MMOL/L (ref 23–29)
CREAT SERPL-MCNC: 1.1 MG/DL (ref 0.5–1.4)
DIFFERENTIAL METHOD BLD: ABNORMAL
EOSINOPHIL # BLD AUTO: 0.1 K/UL (ref 0–0.5)
EOSINOPHIL NFR BLD: 3 % (ref 0–8)
ERYTHROCYTE [DISTWIDTH] IN BLOOD BY AUTOMATED COUNT: 14.4 % (ref 11.5–14.5)
EST. GFR  (NO RACE VARIABLE): >60 ML/MIN/1.73 M^2
GLUCOSE SERPL-MCNC: 86 MG/DL (ref 70–110)
HCT VFR BLD AUTO: 42 % (ref 37–48.5)
HGB BLD-MCNC: 13.6 G/DL (ref 12–16)
IMM GRANULOCYTES # BLD AUTO: 0.01 K/UL (ref 0–0.04)
IMM GRANULOCYTES NFR BLD AUTO: 0.2 % (ref 0–0.5)
LYMPHOCYTES # BLD AUTO: 2.2 K/UL (ref 1–4.8)
LYMPHOCYTES NFR BLD: 50 % (ref 18–48)
MAGNESIUM SERPL-MCNC: 1.8 MG/DL (ref 1.6–2.6)
MCH RBC QN AUTO: 29.6 PG (ref 27–31)
MCHC RBC AUTO-ENTMCNC: 32.4 G/DL (ref 32–36)
MCV RBC AUTO: 91 FL (ref 82–98)
MONOCYTES # BLD AUTO: 0.5 K/UL (ref 0.3–1)
MONOCYTES NFR BLD: 10.9 % (ref 4–15)
NEUTROPHILS # BLD AUTO: 1.5 K/UL (ref 1.8–7.7)
NEUTROPHILS NFR BLD: 35 % (ref 38–73)
NRBC BLD-RTO: 0 /100 WBC
PLATELET # BLD AUTO: 255 K/UL (ref 150–450)
PMV BLD AUTO: 9.9 FL (ref 9.2–12.9)
POTASSIUM SERPL-SCNC: 4.2 MMOL/L (ref 3.5–5.1)
PROT SERPL-MCNC: 7.8 G/DL (ref 6–8.4)
RBC # BLD AUTO: 4.6 M/UL (ref 4–5.4)
SODIUM SERPL-SCNC: 138 MMOL/L (ref 136–145)
TROPONIN I SERPL HS-MCNC: 3.2 PG/ML (ref 0–14.9)
WBC # BLD AUTO: 4.4 K/UL (ref 3.9–12.7)

## 2024-02-04 PROCEDURE — 80053 COMPREHEN METABOLIC PANEL: CPT | Performed by: EMERGENCY MEDICINE

## 2024-02-04 PROCEDURE — 85025 COMPLETE CBC W/AUTO DIFF WBC: CPT | Performed by: EMERGENCY MEDICINE

## 2024-02-04 PROCEDURE — 93010 ELECTROCARDIOGRAM REPORT: CPT | Mod: ,,, | Performed by: GENERAL PRACTICE

## 2024-02-04 PROCEDURE — 84484 ASSAY OF TROPONIN QUANT: CPT | Performed by: EMERGENCY MEDICINE

## 2024-02-04 PROCEDURE — 83735 ASSAY OF MAGNESIUM: CPT | Performed by: EMERGENCY MEDICINE

## 2024-02-04 PROCEDURE — 99285 EMERGENCY DEPT VISIT HI MDM: CPT | Mod: 25

## 2024-02-04 PROCEDURE — 93005 ELECTROCARDIOGRAM TRACING: CPT | Performed by: GENERAL PRACTICE

## 2024-02-04 NOTE — ED PROVIDER NOTES
Encounter Date: 2024       History     Chief Complaint   Patient presents with    Chest Pain     X 3 days intermittent stabbing on L side w/ blurred vision      Patient with a history of well-controlled HIV.  Patient compliant with the medication.  Patient reports she has been having sternal chest pain that is intermittent in nature.  It is mostly in the morning.  She had chest pain this morning.  No chest pain now.  Described as a dull sensation.  She denies sharp sensation like reported to triage.  She does have some intermittent blurry vision.  No history of diabetes.  No pleurisy, hemoptysis or shortness breath.  Patient denies any abdominal pain, nausea vomiting.      Review of patient's allergies indicates:   Allergen Reactions    Iodinated contrast media Hives and Itching     Past Medical History:   Diagnosis Date    AIDS     HIV (human immunodeficiency virus infection)      Past Surgical History:   Procedure Laterality Date     SECTION      ESOPHAGOGASTRODUODENOSCOPY N/A 2022    Procedure: ESOPHAGOGASTRODUODENOSCOPY (EGD);  Surgeon: Zahira Jiang MD;  Location: [facility] [place];  Service: Endoscopy;  Laterality: N/A;    HYSTERECTOMY       No family history on file.  Social History     Tobacco Use    Smoking status: Every Day     Current packs/day: 0.25     Types: Cigarettes    Smokeless tobacco: Never   Substance Use Topics    Alcohol use: No    Drug use: Not Currently     Types: IV     Comment: mirror, spoon and syringe, last use 4 yrs ago (patient stated)     Review of Systems   Constitutional:  Negative for chills and fever.   HENT:  Negative for congestion.    Eyes:  Negative for photophobia.   Respiratory:  Negative for shortness of breath.    Cardiovascular:  Positive for chest pain. Negative for palpitations.   Gastrointestinal:  Negative for abdominal pain and vomiting.   Genitourinary:  Negative for dysuria.   Musculoskeletal:  Negative for joint swelling.   Neurological:  Negative  for headaches.   Psychiatric/Behavioral:  Negative for confusion.        Physical Exam     Initial Vitals [02/04/24 1349]   BP Pulse Resp Temp SpO2   (!) 139/104 83 17 98 °F (36.7 °C) 97 %      MAP       --         Physical Exam    Nursing note and vitals reviewed.  Constitutional: She is not diaphoretic. No distress.   HENT:   Head: Normocephalic and atraumatic.   Eyes: Conjunctivae and EOM are normal. Pupils are equal, round, and reactive to light.   Neck:   Normal range of motion.  Cardiovascular:  Normal rate.           Pulmonary/Chest: Breath sounds normal.   Abdominal: Abdomen is soft. Bowel sounds are normal. There is no abdominal tenderness.   Absolutely no abdominal tenderness   Musculoskeletal:         General: Normal range of motion.      Cervical back: Normal range of motion.     Neurological: She is alert and oriented to person, place, and time. She has normal strength. No cranial nerve deficit or sensory deficit.   No gross deficits   Skin: No rash noted.   Psychiatric: She has a normal mood and affect.         ED Course   Procedures  Labs Reviewed   CBC W/ AUTO DIFFERENTIAL - Abnormal; Notable for the following components:       Result Value    Gran # (ANC) 1.5 (*)     Gran % 35.0 (*)     Lymph % 50.0 (*)     All other components within normal limits   COMPREHENSIVE METABOLIC PANEL - Abnormal; Notable for the following components:    BUN 21 (*)     Alkaline Phosphatase 206 (*)     AST 50 (*)     ALT 49 (*)     Anion Gap 6 (*)     All other components within normal limits   MAGNESIUM   TROPONIN I HIGH SENSITIVITY        ECG Results              EKG 12-lead (In process)  Result time 02/04/24 16:19:40      In process by Interface, Lab In [facility] (02/04/24 16:19:40)                   Narrative:    Test Reason : R07.9,    Vent. Rate : 076 BPM     Atrial Rate : 076 BPM     P-R Int : 178 ms          QRS Dur : 088 ms      QT Int : 372 ms       P-R-T Axes : 075 056 056 degrees     QTc Int : 418 ms    Normal  sinus rhythm  Possible Anterior infarct ,age undetermined  Abnormal ECG  When compared with ECG of 08-NOV-2022 09:29,  Borderline criteria for Anterior infarct are now Present    Referred By: [email or web address]   SELF           Confirmed By:                       In process by Interface, Lab In [facility] (02/04/24 16:16:25)                   Narrative:    Test Reason : R07.9,    Vent. Rate : 076 BPM     Atrial Rate : 076 BPM     P-R Int : 178 ms          QRS Dur : 088 ms      QT Int : 372 ms       P-R-T Axes : 075 056 056 degrees     QTc Int : 418 ms    Normal sinus rhythm  Possible Anterior infarct ,age undetermined  Abnormal ECG  When compared with ECG of 08-NOV-2022 09:29,  Borderline criteria for Anterior infarct are now Present    Referred By: [email or web address]   SELF           Confirmed By:                                   Imaging Results              X-Ray Chest AP Portable (Final result)  Result time 02/04/24 15:00:41      Final result by Kip Chilel MD (02/04/24 15:00:41)                   Narrative:    Reason: Chest Pain    FINDINGS:    Portable chest without comparisons show normal cardiomediastinal silhouette.  Lungs are clear. Pulmonary vasculature is normal. No acute osseous abnormality.    IMPRESSION:    No acute cardiopulmonary abnormality.    Electronically signed by:  Kip Chilel DO  02/04/2024 03:00 PM CST Workstation: [id number]                                     Medications - No data to display  Medical Decision Making  Patient presents with intermittent chest pain.  Patient's last episode of chest pain with this morning multiple hours ago.  Pain is atypical.  Patient with no GI symptoms.  Differential diagnosis includes acute coronary syndrome, pulmonary embolus, pneumonia, cholecystitis.  Here EKG normal sinus rhythm no acute ST segment changes.  Chest x-ray no acute cardiopulmonary process.  Laboratory evaluation including CBC and CMP are significant for slightly elevated alkaline phosphatase.   Patient reports elevated liver function tests in the past.  No symptoms of symptomatic cholelithiasis.  There is no blurry vision now.  Ocular exam is unremarkable.  Clinically patient is atypical history for cardiac chest pain with normal EKG and unremarkable troponin.  Patient is stable for outpatient treatment.  She may need further cardiac evaluation.    Amount and/or Complexity of Data Reviewed  Labs: ordered. Decision-making details documented in ED Course.  Radiology: ordered. Decision-making details documented in ED Course.  ECG/medicine tests:  Decision-making details documented in ED Course.                                      Clinical Impression:  Final diagnoses:  [R07.9] Chest pain          ED Disposition Condition    Discharge Stable          ED Prescriptions    None       Follow-up Information       Follow up With Specialties Details Why Contact Info Additional Information    [facility] - Emergency Dept Emergency Medicine  If symptoms worsen [address] [facility] [id number] [phone] 1st floor    Margaret Holder MD Internal Medicine Schedule an appointment as soon as possible for a visit  Call office tomorrow for further advice [facility] [facility] [address]  [phone]                Sal Samano MD  02/04/24 3415

## 2024-02-08 LAB
OHS QRS DURATION: 88 MS
OHS QTC CALCULATION: 418 MS

## 2024-02-20 ENCOUNTER — HOSPITAL ENCOUNTER (EMERGENCY)
Facility: HOSPITAL | Age: 44
Discharge: HOME | End: 2024-02-20
Attending: STUDENT IN AN ORGANIZED HEALTH CARE EDUCATION/TRAINING PROGRAM
Payer: MEDICAID

## 2024-02-20 VITALS
BODY MASS INDEX: 36.4 KG/M2 | HEART RATE: 67 BPM | RESPIRATION RATE: 18 BRPM | DIASTOLIC BLOOD PRESSURE: 82 MMHG | OXYGEN SATURATION: 100 % | WEIGHT: 260 LBS | SYSTOLIC BLOOD PRESSURE: 154 MMHG | HEIGHT: 71 IN | TEMPERATURE: 99 F

## 2024-02-20 DIAGNOSIS — B02.9 HERPES ZOSTER WITHOUT COMPLICATION: Primary | ICD-10-CM

## 2024-02-20 PROCEDURE — 99283 EMERGENCY DEPT VISIT LOW MDM: CPT

## 2024-02-20 PROCEDURE — 25000003 PHARM REV CODE 250

## 2024-02-20 RX ORDER — HYDROCODONE BITARTRATE AND ACETAMINOPHEN 5; 325 MG/1; MG/1
1 TABLET ORAL
Status: COMPLETED | OUTPATIENT
Start: 2024-02-20 | End: 2024-02-20

## 2024-02-20 RX ORDER — HYDROCODONE BITARTRATE AND ACETAMINOPHEN 5; 325 MG/1; MG/1
1 TABLET ORAL EVERY 6 HOURS PRN
Qty: 12 TABLET | Refills: 0 | Status: SHIPPED | OUTPATIENT
Start: 2024-02-20 | End: 2024-02-23

## 2024-02-20 RX ORDER — VALACYCLOVIR HYDROCHLORIDE 1 G/1
1000 TABLET, FILM COATED ORAL 3 TIMES DAILY
Qty: 21 TABLET | Refills: 0 | Status: SHIPPED | OUTPATIENT
Start: 2024-02-20 | End: 2024-02-27

## 2024-02-20 RX ADMIN — HYDROCODONE BITARTRATE AND ACETAMINOPHEN 1 TABLET: 5; 325 TABLET ORAL at 05:02

## 2024-02-20 NOTE — DISCHARGE INSTRUCTIONS
Please take the Valtrex as prescribed until gone.  Please follow up with the regular provider for further evaluation and recheck.  Please return to the ED for worsening rash, rash waiting to your face or eyes or nose, patient changes that did not resolve, worsening headaches, confusion, fever, or any new or worsening concerns.

## 2024-02-20 NOTE — FIRST PROVIDER EVALUATION
Emergency Department TeleTriage Encounter Note      CHIEF COMPLAINT    Chief Complaint   Patient presents with    Rash     Cluster of blisters to R collar bone states its making her bones and muscles hurt        VITAL SIGNS   Initial Vitals [02/20/24 1217]   BP Pulse Resp Temp SpO2   (!) 123/90 80 18 98.5 °F (36.9 °C) 100 %      MAP       --            ALLERGIES    Review of patient's allergies indicates:   Allergen Reactions    Iodinated contrast media Hives and Itching       PROVIDER TRIAGE NOTE  Patient presents with painful blisters to collar bone area. Feels similar to previous shingles.       ORDERS  Labs Reviewed - No data to display    ED Orders (720h ago, onward)      None              Virtual Visit Note: The provider triage portion of this emergency department evaluation and documentation was performed via [employer], a HIPAA-compliant telemedicine application, in concert with a tele-presenter in the room. A face to face patient evaluation with one of my colleagues will occur once the patient is placed in an emergency department room.      DISCLAIMER: This note was prepared with [employer] voice recognition transcription software. Garbled syntax, mangled pronouns, and other bizarre constructions may be attributed to that software system.

## 2024-02-20 NOTE — Clinical Note
"Stephania Vergara" Aldair was seen and treated in our emergency department on 2/20/2024.  She may return to work on 02/26/2024.       If you have any questions or concerns, please don't hesitate to call.      Rox Brown NP"

## 2024-02-21 NOTE — ED PROVIDER NOTES
Encounter Date: 2024       History     Chief Complaint   Patient presents with    Rash     Cluster of blisters to R collar bone states its making her bones and muscles hurt      Patient is a 43 y.o. female with past medical history of HIV that is well controlled with medication who presents to ED via self for concern for rash which began 2 week(s) ago.  Patient reports she 1st in his redness to her right shoulder with pain.  Patient reports it then progressed into painful blisters 1 week ago.  Patient denies fever reporting T-max 99.0° 2 days ago.  Patient reports she has a history of shingles that is on her right back previously.  Patient reports today she has been having pain in his shooting from the shoulder up into her head and down her right arm.  Patient denies any vomiting or diarrhea.  Patient denies abdominal pain.  Patient denies chest pain or shortness of breath.  Patient denies eye pain.  Patient is awake and alert in no acute distress.      Review of patient's allergies indicates:   Allergen Reactions    Iodinated contrast media Hives and Itching     Past Medical History:   Diagnosis Date    AIDS     HIV (human immunodeficiency virus infection)      Past Surgical History:   Procedure Laterality Date     SECTION      ESOPHAGOGASTRODUODENOSCOPY N/A 2022    Procedure: ESOPHAGOGASTRODUODENOSCOPY (EGD);  Surgeon: Zahira Jiang MD;  Location: [facility];  Service: Endoscopy;  Laterality: N/A;    HYSTERECTOMY       No family history on file.  Social History     Tobacco Use    Smoking status: Every Day     Current packs/day: 0.25     Types: Cigarettes    Smokeless tobacco: Never   Substance Use Topics    Alcohol use: No    Drug use: Not Currently     Types: IV     Comment: mirror, spoon and syringe, last use 4 yrs ago (patient stated)     Review of Systems   Constitutional: Negative.  Negative for fever.   HENT: Negative.  Negative for sore throat and trouble swallowing.    Eyes: Negative.   Negative for pain, discharge, redness, itching and visual disturbance.   Respiratory: Negative.  Negative for shortness of breath.    Cardiovascular: Negative.  Negative for chest pain.   Gastrointestinal: Negative.    Genitourinary: Negative.  Negative for dysuria.   Musculoskeletal:  Positive for neck pain. Negative for back pain and neck stiffness.   Skin:  Positive for rash. Negative for color change and pallor.   Neurological:  Positive for headaches. Negative for dizziness, seizures, syncope, facial asymmetry, speech difficulty, weakness, light-headedness and numbness.   Hematological:  Does not bruise/bleed easily.   Psychiatric/Behavioral: Negative.         Physical Exam     Initial Vitals [02/20/24 1217]   BP Pulse Resp Temp SpO2   (!) 123/90 80 18 98.5 °F (36.9 °C) 100 %      MAP       --         Physical Exam    Nursing note and vitals reviewed.  Constitutional: She appears well-developed and well-nourished. She is not diaphoretic.  Non-toxic appearance. She does not have a sickly appearance. She does not appear ill. No distress.   HENT:   Head: Normocephalic and atraumatic.   Right Ear: Tympanic membrane, external ear and ear canal normal.   Left Ear: Tympanic membrane, external ear and ear canal normal.   Nose: Nose normal.   Mouth/Throat: Uvula is midline, oropharynx is clear and moist and mucous membranes are normal. No oropharyngeal exudate, posterior oropharyngeal edema or posterior oropharyngeal erythema.   Eyes: EOM are normal. Pupils are equal, round, and reactive to light. Right eye exhibits no discharge. Left eye exhibits no discharge.   Neck: Neck supple.   Normal range of motion.  Cardiovascular:  Normal rate, regular rhythm, normal heart sounds and intact distal pulses.     Exam reveals no gallop and no friction rub.       No murmur heard.  Pulmonary/Chest: Breath sounds normal. No respiratory distress. She has no wheezes. She has no rhonchi. She has no rales. She exhibits no tenderness.    Musculoskeletal:         General: Normal range of motion.      Cervical back: Normal range of motion and neck supple.     Neurological: She is alert and oriented to person, place, and time. She has normal strength. She is not disoriented. Coordination normal. GCS score is 15. GCS eye subscore is 4. GCS verbal subscore is 5. GCS motor subscore is 6.   Skin: Skin is warm and dry. Capillary refill takes less than 2 seconds. Rash noted. Rash is vesicular. There is erythema.        Psychiatric: She has a normal mood and affect. Her behavior is normal. Judgment and thought content normal.         ED Course   Procedures  Labs Reviewed - No data to display       Imaging Results    None          Medications   HYDROcodone-acetaminophen 5-325 mg per tablet 1 tablet (1 tablet Oral Given 2/20/24 1713)     Medical Decision Making  [facility]    Patient presents for emergent evaluation of acute rash that poses a possible threat to life and/or bodily function.    Differential diagnosis included but not limited to shingles, other nonspecific rash.  In the ED patient found to have acute red vesicles to right shoulder and right arm and right chest.  Patient has pain that seems to be described as dermatome pain that she was up into the right side of her head and down her right arm.  Patient has normal strength in bilateral upper and lower extremities.  Patient is neuro intact.  Patient has no vesicles/rash noted on face or within her mouth.  Patient has normal range of motion of neck without stiffness or difficulty.  Patient afebrile in the ED.    Discharge MDM  I discussed the patient presentation, findings with my attending Dr. Garcia who individually evaluated patient and agrees with plan of care.  Patient was managed in the ED with oral pain medication.    The response to treatment was good.    Most likely diagnosis herpes zoster based off physical exam tightness.  No concerning findings on physical exam for disseminated  zoster.  Patient was discharged in stable condition with close follow up with primary care.  Detailed return precautions discussed to return to the ED rash spreading to the face, changes in vision, fever, neck stiffness, worsening rash, or any new or worsening concerns.  Patient states understanding.    Risk  Prescription drug management.                                      Clinical Impression:  Final diagnoses:  [B02.9] Herpes zoster without complication (Primary)          ED Disposition Condition    Discharge Stable          ED Prescriptions       Medication Sig Dispense Start Date End Date Auth. Provider    valACYclovir (VALTREX) 1000 MG tablet Take 1 tablet (1,000 mg total) by mouth 3 (three) times daily. for 7 days 21 tablet 2/20/2024 2/27/2024 Rox Brown NP    HYDROcodone-acetaminophen (NORCO) 5-325 mg per tablet Take 1 tablet by mouth every 6 (six) hours as needed for Pain. 12 tablet 2/20/2024 2/23/2024 Rox Brown NP          Follow-up Information       Follow up With Specialties Details Why Contact Info Additional Information    Margaret Holder MD Internal Medicine Schedule an appointment as soon as possible for a visit  For recheck/continuing care [address] [facility] [address]  [phone]       formerly [facility] - Emergency Dept Emergency Medicine  If symptoms worsen [address] [facility] [address] [phone] 1st floor             Rox Brown NP  02/21/24 0056

## 2024-05-13 ENCOUNTER — OFFICE VISIT (OUTPATIENT)
Dept: URGENT CARE | Facility: CLINIC | Age: 44
End: 2024-05-13
Payer: MEDICAID

## 2024-05-13 VITALS
OXYGEN SATURATION: 99 % | WEIGHT: 260 LBS | RESPIRATION RATE: 16 BRPM | HEART RATE: 89 BPM | BODY MASS INDEX: 36.4 KG/M2 | DIASTOLIC BLOOD PRESSURE: 79 MMHG | HEIGHT: 71 IN | TEMPERATURE: 98 F | SYSTOLIC BLOOD PRESSURE: 107 MMHG

## 2024-05-13 DIAGNOSIS — R30.0 DYSURIA: ICD-10-CM

## 2024-05-13 DIAGNOSIS — L25.3 CONTACT DERMATITIS DUE TO OTHER CHEMICAL PRODUCT, UNSPECIFIED CONTACT DERMATITIS TYPE: ICD-10-CM

## 2024-05-13 DIAGNOSIS — N30.90 CYSTITIS: Primary | ICD-10-CM

## 2024-05-13 LAB
BILIRUB UR QL STRIP: NEGATIVE
GLUCOSE SERPL-MCNC: 98 MG/DL (ref 70–110)
GLUCOSE UR QL STRIP: NEGATIVE
KETONES UR QL STRIP: NEGATIVE
LEUKOCYTE ESTERASE UR QL STRIP: POSITIVE
PH, POC UA: 5.5
POC BLOOD, URINE: POSITIVE
POC NITRATES, URINE: POSITIVE
PROT UR QL STRIP: POSITIVE
SP GR UR STRIP: 1.03 (ref 1–1.03)
UROBILINOGEN UR STRIP-ACNC: 0.2 (ref 0.1–1.1)

## 2024-05-13 PROCEDURE — 99204 OFFICE O/P NEW MOD 45 MIN: CPT | Mod: S$GLB,,,

## 2024-05-13 PROCEDURE — 81003 URINALYSIS AUTO W/O SCOPE: CPT | Mod: QW,S$GLB,,

## 2024-05-13 PROCEDURE — 82962 GLUCOSE BLOOD TEST: CPT | Mod: ,,,

## 2024-05-13 RX ORDER — POTASSIUM CHLORIDE 20 MEQ/1
20 TABLET, EXTENDED RELEASE ORAL DAILY PRN
COMMUNITY

## 2024-05-13 RX ORDER — METFORMIN HYDROCHLORIDE 500 MG/1
1000 TABLET ORAL
COMMUNITY
Start: 2024-03-27 | End: 2025-03-27

## 2024-05-13 RX ORDER — BICTEGRAVIR SODIUM, EMTRICITABINE, AND TENOFOVIR ALAFENAMIDE FUMARATE 50; 200; 25 MG/1; MG/1; MG/1
1 TABLET ORAL DAILY
COMMUNITY

## 2024-05-13 RX ORDER — CEPHALEXIN 500 MG/1
500 CAPSULE ORAL EVERY 12 HOURS
Qty: 10 CAPSULE | Refills: 0 | Status: SHIPPED | OUTPATIENT
Start: 2024-05-13 | End: 2024-05-18

## 2024-05-13 RX ORDER — CEPHALEXIN 500 MG/1
500 CAPSULE ORAL EVERY 12 HOURS
Qty: 10 CAPSULE | Refills: 0 | Status: SHIPPED | OUTPATIENT
Start: 2024-05-13 | End: 2024-05-13

## 2024-05-13 NOTE — PROGRESS NOTES
" Subjective:      Patient ID: Rebecachbright Quinteros is a 43 y.o. female.    Vitals:  height is 5' 10.5" (1.791 m) and weight is 117.9 kg (260 lb). Her temperature is 97.7 °F (36.5 °C). Her blood pressure is 107/79 and her pulse is 89. Her respiration is 16 and oxygen saturation is 99%.     Chief Complaint: Burn    In clinic with a chief complaint of malodorous urine for 3 days.  Denies urgency, frequency, or burning.    She was also complaining itching, and burning rash on forehead, and right side of neck.  Rash occurred after using minoxidil.  No erythema.  Rashes mildly raised in papular.  No drainage.  OTC treatments not effective.    Patient also requesting glucose check.  She has a scheduled hip replacement in 1 week.    Burn  The burns are located on the face and neck. She has tried salve for the symptoms. The treatment provided mild relief.   Dysuria   This is a new problem. The current episode started in the past 7 days (x 3 days). The problem has been unchanged. Pertinent negatives include no chills, discharge, flank pain, frequency, nausea, urgency or vomiting. Associated symptoms comments: odor. Her past medical history is significant for diabetes mellitus.       Constitution: Negative for chills.   HENT: Negative.     Neck: neck negative.   Cardiovascular: Negative.    Eyes: Negative.    Respiratory: Negative.     Gastrointestinal:  Negative for nausea and vomiting.   Endocrine: negative.   Genitourinary:  Negative for dysuria, frequency, urgency and flank pain.        Malodorous urine   Musculoskeletal: Negative.    Skin: Negative.    Allergic/Immunologic: Positive for immunizations up-to-date.   Neurological: Negative.    Hematologic/Lymphatic: Negative.    Psychiatric/Behavioral: Negative.        Objective:     Physical Exam   Constitutional: She is oriented to person, place, and time. She appears well-developed. She is cooperative.   HENT:   Head: Normocephalic and atraumatic.       Ears:   Right " Ear: Hearing and external ear normal.   Left Ear: Hearing and external ear normal.   Nose: Nose normal. No mucosal edema or nasal deformity. No epistaxis. Right sinus exhibits no maxillary sinus tenderness and no frontal sinus tenderness. Left sinus exhibits no maxillary sinus tenderness and no frontal sinus tenderness.   Mouth/Throat: Uvula is midline, oropharynx is clear and moist and mucous membranes are normal. Mucous membranes are moist. No trismus in the jaw. Normal dentition. No uvula swelling. Oropharynx is clear.   Eyes: Conjunctivae and lids are normal. Pupils are equal, round, and reactive to light. Extraocular movement intact   Neck: Trachea normal and phonation normal. Neck supple.   Cardiovascular: Normal rate, regular rhythm, normal heart sounds and normal pulses.   Pulmonary/Chest: Effort normal and breath sounds normal.   Abdominal: Normal appearance. Soft. flat abdomen There is no abdominal tenderness. There is no left CVA tenderness and no right CVA tenderness.   Musculoskeletal: Normal range of motion.         General: Normal range of motion.   Neurological: no focal deficit. She is alert, oriented to person, place, and time and at baseline. She exhibits normal muscle tone.   Skin: Skin is warm, dry and intact. Capillary refill takes 2 to 3 seconds.   Psychiatric: Her speech is normal and behavior is normal. Mood, judgment and thought content normal.   Nursing note and vitals reviewed.      Assessment:     1. Cystitis    2. Dysuria    3. Contact dermatitis due to other chemical product, unspecified contact dermatitis type        Plan:       Cystitis  -     Discontinue: cephALEXin (KEFLEX) 500 MG capsule; Take 1 capsule (500 mg total) by mouth every 12 (twelve) hours. for 5 days  Dispense: 10 capsule; Refill: 0  -     CULTURE, URINE  -     cephALEXin (KEFLEX) 500 MG capsule; Take 1 capsule (500 mg total) by mouth every 12 (twelve) hours. for 5 days  Dispense: 10 capsule; Refill: 0    Dysuria  -      POCT Urinalysis, Dipstick, Automated, W/O Scope  -     POCT Glucose, Hand-Held Device    Contact dermatitis due to other chemical product, unspecified contact dermatitis type    Patient has OTC meds and is in agreeance with plan.  There is no evidence of cellulitis, or zoster.  It is likely a reaction to minoxidil that she used.  Urine positive leukocytes, nitrites, RBCs, and protein.  Sending for culture for sensitivity.   CBG 98.

## 2024-05-17 ENCOUNTER — TELEPHONE (OUTPATIENT)
Dept: URGENT CARE | Facility: CLINIC | Age: 44
End: 2024-05-17
Payer: MEDICAID

## 2024-05-17 DIAGNOSIS — N39.0 E. COLI UTI (URINARY TRACT INFECTION): Primary | ICD-10-CM

## 2024-05-17 DIAGNOSIS — B96.20 E. COLI UTI (URINARY TRACT INFECTION): Primary | ICD-10-CM

## 2024-05-17 LAB
BACTERIA UR CULT: ABNORMAL
BACTERIA UR CULT: ABNORMAL
OTHER ANTIBIOTIC SUSC ISLT: ABNORMAL

## 2024-05-17 NOTE — TELEPHONE ENCOUNTER
Contacted patient and advised of positive urine culture results for E coli.  Patient reports complete resolution of symptoms.  Patient reports feeling much better.  Patient should follow-up with PCP we will return to clinic as needed.  Patient has no further questions or concerns at this point.

## 2024-06-06 ENCOUNTER — HOSPITAL ENCOUNTER (EMERGENCY)
Facility: HOSPITAL | Age: 44
Discharge: HOME | End: 2024-06-07
Attending: EMERGENCY MEDICINE
Payer: MEDICAID

## 2024-06-06 DIAGNOSIS — N39.0 URINARY TRACT INFECTION WITHOUT HEMATURIA, SITE UNSPECIFIED: Primary | ICD-10-CM

## 2024-06-06 DIAGNOSIS — M79.89 LEFT LEG SWELLING: ICD-10-CM

## 2024-06-06 LAB
ALBUMIN SERPL BCP-MCNC: 3.4 G/DL (ref 3.5–5.2)
ALP SERPL-CCNC: 168 U/L (ref 55–135)
ALT SERPL W/O P-5'-P-CCNC: 34 U/L (ref 10–44)
ANION GAP SERPL CALC-SCNC: 12 MMOL/L (ref 8–16)
AST SERPL-CCNC: 34 U/L (ref 10–40)
BACTERIA #/AREA URNS HPF: ABNORMAL /HPF
BASOPHILS # BLD AUTO: 0.03 K/UL (ref 0–0.2)
BASOPHILS NFR BLD: 0.3 % (ref 0–1.9)
BILIRUB SERPL-MCNC: 0.7 MG/DL (ref 0.1–1)
BILIRUB UR QL STRIP: NEGATIVE
BUN SERPL-MCNC: 14 MG/DL (ref 6–20)
CALCIUM SERPL-MCNC: 9.5 MG/DL (ref 8.7–10.5)
CHLORIDE SERPL-SCNC: 105 MMOL/L (ref 95–110)
CLARITY UR: ABNORMAL
CO2 SERPL-SCNC: 22 MMOL/L (ref 23–29)
COLOR UR: YELLOW
CREAT SERPL-MCNC: 1.3 MG/DL (ref 0.5–1.4)
DIFFERENTIAL METHOD BLD: ABNORMAL
EOSINOPHIL # BLD AUTO: 0.1 K/UL (ref 0–0.5)
EOSINOPHIL NFR BLD: 0.5 % (ref 0–8)
ERYTHROCYTE [DISTWIDTH] IN BLOOD BY AUTOMATED COUNT: 14.9 % (ref 11.5–14.5)
EST. GFR  (NO RACE VARIABLE): 52 ML/MIN/1.73 M^2
GLUCOSE SERPL-MCNC: 105 MG/DL (ref 70–110)
GLUCOSE UR QL STRIP: NEGATIVE
HCT VFR BLD AUTO: 34.2 % (ref 37–48.5)
HGB BLD-MCNC: 10.9 G/DL (ref 12–16)
HGB UR QL STRIP: ABNORMAL
HYALINE CASTS #/AREA URNS LPF: 0 /LPF
IMM GRANULOCYTES # BLD AUTO: 0.04 K/UL (ref 0–0.04)
IMM GRANULOCYTES NFR BLD AUTO: 0.4 % (ref 0–0.5)
KETONES UR QL STRIP: NEGATIVE
LEUKOCYTE ESTERASE UR QL STRIP: ABNORMAL
LYMPHOCYTES # BLD AUTO: 0.7 K/UL (ref 1–4.8)
LYMPHOCYTES NFR BLD: 6.5 % (ref 18–48)
MCH RBC QN AUTO: 28.2 PG (ref 27–31)
MCHC RBC AUTO-ENTMCNC: 31.9 G/DL (ref 32–36)
MCV RBC AUTO: 89 FL (ref 82–98)
MICROSCOPIC COMMENT: ABNORMAL
MONOCYTES # BLD AUTO: 0.1 K/UL (ref 0.3–1)
MONOCYTES NFR BLD: 1.3 % (ref 4–15)
NEUTROPHILS # BLD AUTO: 9.6 K/UL (ref 1.8–7.7)
NEUTROPHILS NFR BLD: 91 % (ref 38–73)
NITRITE UR QL STRIP: POSITIVE
NRBC BLD-RTO: 0 /100 WBC
PH UR STRIP: 6 [PH] (ref 5–8)
PLATELET # BLD AUTO: 396 K/UL (ref 150–450)
PMV BLD AUTO: 9.1 FL (ref 9.2–12.9)
POTASSIUM SERPL-SCNC: 3.5 MMOL/L (ref 3.5–5.1)
PROT SERPL-MCNC: 7.5 G/DL (ref 6–8.4)
PROT UR QL STRIP: ABNORMAL
RBC # BLD AUTO: 3.86 M/UL (ref 4–5.4)
RBC #/AREA URNS HPF: 60 /HPF (ref 0–4)
SODIUM SERPL-SCNC: 139 MMOL/L (ref 136–145)
SP GR UR STRIP: 1.02 (ref 1–1.03)
SQUAMOUS #/AREA URNS HPF: 2 /HPF
URN SPEC COLLECT METH UR: ABNORMAL
UROBILINOGEN UR STRIP-ACNC: NEGATIVE EU/DL
WBC # BLD AUTO: 10.49 K/UL (ref 3.9–12.7)
WBC #/AREA URNS HPF: >100 /HPF (ref 0–5)

## 2024-06-06 PROCEDURE — 96372 THER/PROPH/DIAG INJ SC/IM: CPT | Performed by: EMERGENCY MEDICINE

## 2024-06-06 PROCEDURE — 87086 URINE CULTURE/COLONY COUNT: CPT | Performed by: EMERGENCY MEDICINE

## 2024-06-06 PROCEDURE — 99285 EMERGENCY DEPT VISIT HI MDM: CPT | Mod: 25

## 2024-06-06 PROCEDURE — 96366 THER/PROPH/DIAG IV INF ADDON: CPT

## 2024-06-06 PROCEDURE — 81000 URINALYSIS NONAUTO W/SCOPE: CPT | Performed by: EMERGENCY MEDICINE

## 2024-06-06 PROCEDURE — 25000003 PHARM REV CODE 250: Performed by: EMERGENCY MEDICINE

## 2024-06-06 PROCEDURE — 63600175 PHARM REV CODE 636 W HCPCS: Performed by: EMERGENCY MEDICINE

## 2024-06-06 PROCEDURE — 87186 SC STD MICRODIL/AGAR DIL: CPT | Performed by: EMERGENCY MEDICINE

## 2024-06-06 PROCEDURE — 96365 THER/PROPH/DIAG IV INF INIT: CPT

## 2024-06-06 PROCEDURE — 80053 COMPREHEN METABOLIC PANEL: CPT | Performed by: EMERGENCY MEDICINE

## 2024-06-06 PROCEDURE — 85025 COMPLETE CBC W/AUTO DIFF WBC: CPT | Performed by: EMERGENCY MEDICINE

## 2024-06-06 PROCEDURE — 25500020 PHARM REV CODE 255

## 2024-06-06 PROCEDURE — 96368 THER/DIAG CONCURRENT INF: CPT

## 2024-06-06 RX ORDER — DIPHENHYDRAMINE HYDROCHLORIDE 50 MG/ML
50 INJECTION INTRAMUSCULAR; INTRAVENOUS ONCE
Status: COMPLETED | OUTPATIENT
Start: 2024-06-06 | End: 2024-06-06

## 2024-06-06 RX ADMIN — IOHEXOL 100 ML: 350 INJECTION, SOLUTION INTRAVENOUS at 11:06

## 2024-06-06 RX ADMIN — DIPHENHYDRAMINE HYDROCHLORIDE 50 MG: 50 INJECTION INTRAMUSCULAR; INTRAVENOUS at 10:06

## 2024-06-06 RX ADMIN — HYDROCORTISONE SODIUM SUCCINATE 200 MG: 100 INJECTION, POWDER, FOR SOLUTION INTRAMUSCULAR; INTRAVENOUS at 10:06

## 2024-06-06 RX ADMIN — CEFTRIAXONE SODIUM 1 G: 1 INJECTION, POWDER, FOR SOLUTION INTRAMUSCULAR; INTRAVENOUS at 10:06

## 2024-06-07 VITALS
DIASTOLIC BLOOD PRESSURE: 78 MMHG | OXYGEN SATURATION: 95 % | TEMPERATURE: 99 F | RESPIRATION RATE: 18 BRPM | HEART RATE: 89 BPM | BODY MASS INDEX: 38.19 KG/M2 | WEIGHT: 270 LBS | SYSTOLIC BLOOD PRESSURE: 119 MMHG

## 2024-06-07 PROCEDURE — 96366 THER/PROPH/DIAG IV INF ADDON: CPT

## 2024-06-07 RX ORDER — CEFUROXIME AXETIL 500 MG/1
500 TABLET ORAL EVERY 12 HOURS
Qty: 14 TABLET | Refills: 0 | Status: SHIPPED | OUTPATIENT
Start: 2024-06-07 | End: 2024-06-24

## 2024-06-07 NOTE — ED PROVIDER NOTES
Encounter Date: 2024       History     Chief Complaint   Patient presents with    Flank Pain     Pt brought to ED via EMS with complaint of left sided flank pain.       Patient presents to the emergency department with reported left sided flank pain onset this evening she is status post left hip replacement states he is having increasing pain in the left leg is concerned about a blood clot she also complains of urinary frequency she denies any vomiting or diarrhea she reports decreased p.o. intake and fatigue she does complain of pain in her back as well he does smoke he has a history of HIV no previous history of blood clots        Review of patient's allergies indicates:   Allergen Reactions    Iodinated contrast media Hives and Itching     Past Medical History:   Diagnosis Date    AIDS     HIV (human immunodeficiency virus infection)      Past Surgical History:   Procedure Laterality Date     SECTION      ESOPHAGOGASTRODUODENOSCOPY N/A 2022    Procedure: ESOPHAGOGASTRODUODENOSCOPY (EGD);  Surgeon: Zahira Jiang MD;  Location: [facility];  Service: Endoscopy;  Laterality: N/A;    HYSTERECTOMY       No family history on file.  Social History     Tobacco Use    Smoking status: Every Day     Current packs/day: 0.25     Types: Cigarettes    Smokeless tobacco: Never   Substance Use Topics    Alcohol use: No    Drug use: Not Currently     Types: IV     Comment: mirror, spoon and syringe, last use 4 yrs ago (patient stated)     Review of Systems   Constitutional:  Negative for chills and fever.   HENT:  Negative for congestion.    Respiratory:  Negative for shortness of breath.    Gastrointestinal:  Positive for nausea. Negative for abdominal pain and vomiting.   Genitourinary:  Positive for flank pain and frequency. Negative for difficulty urinating and urgency.   Musculoskeletal:  Positive for myalgias.   All other systems reviewed and are negative.      Physical Exam     Initial Vitals [24  2051]   BP Pulse Resp Temp SpO2   127/78 109 18 98.7 °F (37.1 °C) 99 %      MAP       --         Physical Exam    Constitutional: She appears well-developed and well-nourished. No distress.   HENT:   Head: Normocephalic and atraumatic.   Right Ear: External ear normal.   Left Ear: External ear normal.   Mouth/Throat: Oropharynx is clear and moist.   Eyes: Conjunctivae and EOM are normal. Pupils are equal, round, and reactive to light.   Neck: Neck supple.   Normal range of motion.  Cardiovascular:  Normal rate, regular rhythm, normal heart sounds and intact distal pulses.           Pulmonary/Chest: Breath sounds normal. No respiratory distress.   Abdominal: Abdomen is soft. Bowel sounds are normal. There is no abdominal tenderness.   Musculoskeletal:         General: No edema. Normal range of motion.      Cervical back: Normal range of motion and neck supple.     Neurological: She is alert and oriented to person, place, and time. GCS score is 15. GCS eye subscore is 4. GCS verbal subscore is 5. GCS motor subscore is 6.   Skin: Skin is warm and dry. Capillary refill takes less than 2 seconds. No rash noted.   Psychiatric: She has a normal mood and affect. Her behavior is normal.         ED Course   Procedures  Labs Reviewed   CBC W/ AUTO DIFFERENTIAL - Abnormal; Notable for the following components:       Result Value    RBC 3.86 (*)     Hemoglobin 10.9 (*)     Hematocrit 34.2 (*)     MCHC 31.9 (*)     RDW 14.9 (*)     MPV 9.1 (*)     Gran # (ANC) 9.6 (*)     Lymph # 0.7 (*)     Mono # 0.1 (*)     Gran % 91.0 (*)     Lymph % 6.5 (*)     Mono % 1.3 (*)     All other components within normal limits    Narrative:     Collection has been rescheduled by MRR at 06/06/2024 21:36 Reason:   Unable to collect   COMPREHENSIVE METABOLIC PANEL - Abnormal; Notable for the following components:    CO2 22 (*)     Albumin 3.4 (*)     Alkaline Phosphatase 168 (*)     eGFR 52 (*)     All other components within normal limits     Narrative:     Collection has been rescheduled by MRR at 06/06/2024 21:36 Reason:   Unable to collect   URINALYSIS, REFLEX TO URINE CULTURE - Abnormal; Notable for the following components:    Appearance, UA Hazy (*)     Protein, UA 1+ (*)     Occult Blood UA 3+ (*)     Nitrite, UA Positive (*)     Leukocytes, UA 3+ (*)     All other components within normal limits    Narrative:     Specimen Source->Urine   URINALYSIS MICROSCOPIC - Abnormal; Notable for the following components:    RBC, UA 60 (*)     WBC, UA >100 (*)     Bacteria Moderate (*)     All other components within normal limits    Narrative:     Specimen Source->Urine   CULTURE, URINE          Imaging Results              US Lower Extremity Veins Left (In process)  Result time 06/06/24 23:40:08                     CTA Chest Non-Coronary (PE Studies) (In process)                      CT Renal Stone Study ABD Pelvis WO (In process)                      Medications   hydrocortisone sodium succinate injection 200 mg (200 mg Intravenous Given 6/6/24 2227)   diphenhydrAMINE injection 50 mg (50 mg Intramuscular Given 6/6/24 2227)   cefTRIAXone (Rocephin) 1 g in dextrose 5 % in water (D5W) 100 mL IVPB (MB+) (0 g Intravenous Stopped 6/6/24 2326)   iohexoL (OMNIPAQUE 350) 350 mg iodine/mL injection (100 mLs Intravenous Given 6/6/24 2351)     Medical Decision Making  Laboratory evaluation reviewed patient's urine shows evidence of urinary tract infection likely cause of her symptoms ultrasound was obtained to rule out DVT given patient's concern for increased pain in her leg CTA of the chest obtained as well given the flank pain to rule out any possible pulmonary embolism will treat urinary tract infection with antibiotics 1st dose Rocephin given the emergency department return to ER for any worsened symptoms or new symptoms follow-up with your surgeon in the morning     Amount and/or Complexity of Data Reviewed  Labs: ordered. Decision-making details documented in  ED Course.  Radiology: ordered. Decision-making details documented in ED Course.    Risk  Prescription drug management.                                      Clinical Impression:  Final diagnoses:  [M79.89] Left leg swelling  [N39.0] Urinary tract infection without hematuria, site unspecified (Primary)          ED Disposition Condition    Discharge Stable          ED Prescriptions       Medication Sig Dispense Start Date End Date Auth. Provider    cefUROXime (CEFTIN) 500 MG tablet Take 1 tablet (500 mg total) by mouth every 12 (twelve) hours. for 7 days 14 tablet 6/7/2024 6/14/2024 Robin Layton MD          Follow-up Information       Follow up With Specialties Details Why Contact Info    Margaret Holder MD Internal Medicine In 1 day for re-examination of your symptoms [address] [facility] [address]  [phone]               Robin Layton MD  06/07/24 3660

## 2024-06-09 LAB — BACTERIA UR CULT: ABNORMAL

## 2024-07-18 DIAGNOSIS — Z96.649 STATUS POST HIP REPLACEMENT: Primary | ICD-10-CM

## 2024-07-22 ENCOUNTER — CLINICAL SUPPORT (OUTPATIENT)
Dept: REHABILITATION | Facility: HOSPITAL | Age: 44
End: 2024-07-22
Payer: MEDICAID

## 2024-07-22 DIAGNOSIS — R26.9 GAIT ABNORMALITY: Primary | ICD-10-CM

## 2024-07-22 DIAGNOSIS — M25.60 RANGE OF MOTION DEFICIT: ICD-10-CM

## 2024-07-22 PROCEDURE — 97161 PT EVAL LOW COMPLEX 20 MIN: CPT | Mod: PN

## 2024-07-23 NOTE — PLAN OF CARE
[facility]   Physical Therapy Initial Evaluation      Name: Stephania Quinteros  Clinic Number: [id number]    Therapy Diagnosis:   Encounter Diagnoses   Name Primary?    Gait abnormality Yes    Range of motion deficit         Physician: Order, Paper    Physician Orders: PT Eval and Treat  Medical Diagnosis from Referral:   Diagnosis   Z96.649 (ICD-10-CM) - Status post hip replacement     Evaluation Date: 7/22/2024  Authorization Period Expiration: 12/31/2024   Plan of Care Expiration: 9/16/2024  Progress Note Due: 8/22/2024  Visit # / Visits authorized: 1/ 1   FOTO: 1/ 3    Precautions: Standard , prediabetic     Time In: 300pm  Time Out: 335pm  Total Billable Time: 35 minutes    Subjective     Date of onset: chronic hip pain    History of current condition - Stephnaia reports: chronic hip pain that lead to a left hip replacement on May 20th. She did home health for a couple weeks and also did OP therapy at [facility] for a couple weeks. She wanted to transfer here since she lives around here.   She still has difficulty walking, though she is not using an AD anymore.   She is still scared of dislocating her hip. She can't put her shoes on bent over yet because she is scared to fully try.   She can sit in a regular chair now which she is happy about. Stairs are fine.      Falls: none     Imaging: see imaging section    Prior Therapy: home health and two weeks at [facility] in [place]   Social History: Lives alone  Occupation: unemployed currently but wants a job  Prior Level of Function: no pain affecting work and Activities of daily living   Current Level of Function: unable to work due to pain and weakness     Pain:  Current 5/10, worst 7/10, best 4/10   Location: left hip  Description: stiffness  Aggravating Factors: sleeping, working out   Easing Factors: pain meds    Patients goals: improve function, be able to work, be able to put on her shoes     Medical History:   Past Medical History:    Diagnosis Date    AIDS     HIV (human immunodeficiency virus infection)        Surgical History:   Stephania Quinteros  has a past surgical history that includes  section; Hysterectomy; and Esophagogastroduodenoscopy (N/A, 2022).    Medications:   Stephania has a current medication list which includes the following prescription(s): biktarvy, darunavir, genvoya, fluticasone propionate, hydroxyzine pamoate, ketoconazole, lidocaine viscous hcl 2%, metformin, pantoprazole, potassium chloride sa, promethazine, sucralfate, and valacyclovir.    Allergies:   Review of patient's allergies indicates:   Allergen Reactions    Iodinated contrast media Hives and Itching        Objective      Observation:   Patient is a 43 year old female presenting alert and oriented.     Posture:   Structural knee valgus on the surgical side    Gait pattern:  Trendelenburg gait on the surgical side    Hip Range of Motion:   Left Passive Right Passive   Flexion 85 95   Extension -5 0   Ext. Rotation 15 45   Int. Rotation 10 30     Hip Joint Mobility: hypomobile as expected post op     Lower Extremity Strength    Active SLR: able to Straight leg raise      Left  Right    Knee extension: 4-/5 4/5   Knee flexion: 4-/5 4/5   Hip flexion: 4-/5 4/5   Hip extension:  3-/5 3-/5   Hip abduction: 3-/5 3-/5     Special Tests:  Not tested due to post op status      Balance Assessment:       Evaluation   Single Limb Stance R LE 10 seconds   (<10 sec = HIGH FALL RISK)   Single Limb Stance L LE unable  (<10 sec = HIGH FALL RISK)        Squat - dynamic knee valgus         Intake Outcome Measure for FOTO Hip Survey    Therapist reviewed FOTO scores for Stephania Quinteros on 2024.   FOTO report - see Media section or FOTO account episode details.    Intake Score: 56%  Predicted Score: 74%           Treatment     Total Treatment time (time-based codes) separate from Evaluation: 15 minutes     Stephania received the treatments listed  below:      therapeutic exercises to develop strength and ROM for 15 minutes including:    Nustep  Bridges  Side Lying clamshell   Mini squats       Patient Education and Home Exercises     Education provided:   - Home Exercise Program, diagnosis, prognosis, Plan of care     Written Home Exercises Provided: yes. Exercises were reviewed and Stephania was able to demonstrate them prior to the end of the session.  Stephania demonstrated good  understanding of the education provided. See EMR under Patient Instructions for exercises provided during therapy sessions.    Assessment     Stephania is a 43 y.o. female referred to outpatient Physical Therapy about 9 weeks post left COLBY. She presented with gait deviations, decreased Lower Extremity strength on the left, painful and limited range of motion in the left hip, and functional limitations of inability to work.     Patient prognosis is Good.   Patient will benefit from skilled outpatient Physical Therapy to address the deficits stated above and in the chart below, provide patient /family education, and to maximize patientt's level of independence.     Plan of care discussed with patient: Yes  Patient's spiritual, cultural and educational needs considered and patient is agreeable to the plan of care and goals as stated below:     Anticipated Barriers for therapy: structural knee valgus    Medical Necessity is demonstrated by the following  History  Co-morbidities and personal factors that may impact the plan of care [] LOW: no personal factors / co-morbidities  [x] MODERATE: 1-2 personal factors / co-morbidities  [] HIGH: 3+ personal factors / co-morbidities    Moderate / High Support Documentation:   Co-morbidities affecting plan of care: BMI, HIV    Personal Factors:   no deficits     Examination  Body Structures and Functions, activity limitations and participation restrictions that may impact the plan of care [] LOW: addressing 1-2 elements  [] MODERATE: 3+  elements  [x] HIGH: 4+ elements (please support below)    Moderate / High Support Documentation: posture, gait, strength, range of motion      Clinical Presentation [x] LOW: stable  [] MODERATE: Evolving  [] HIGH: Unstable     Decision Making/ Complexity Score: low       Goals:  Short Term Goals: 4 weeks   Patient will be independent in Home Exercise Program to support improving range of motion and strength.   Patient will have improved hip range of motion to equal the uninvolved side.   Patient will have improved hip extension strength by 1/3 MMT.     Long Term Goals: 8 weeks   Patient will be independent in progressed Home Exercise Program to support improving range of motion and strength.   Patient goal:   Patient will have improved FOTO score to predicted level to show true functional improvements.   Patient will have strength equal to the uninvolved side to decrease functional gait deviation.     Plan     Plan of care Certification: 7/22/2024 to 9/16/2024.    Outpatient Physical Therapy 1-2 times weekly for 8 weeks to include the following interventions: Gait Training, Manual Therapy, Moist Heat/ Ice, Neuromuscular Re-ed, Patient Education, Therapeutic Activities, and Therapeutic Exercise.     Mary Lin, PT, DPT  Board Certified Clinical Specialist in Orthopedic Physical Therapy         Physician's Signature: _________________________________________ Date: ________________

## 2024-07-29 ENCOUNTER — HOSPITAL ENCOUNTER (EMERGENCY)
Facility: HOSPITAL | Age: 44
Discharge: LEFT BEFORE DISCHARGE | End: 2024-07-29
Attending: STUDENT IN AN ORGANIZED HEALTH CARE EDUCATION/TRAINING PROGRAM
Payer: MEDICAID

## 2024-07-29 VITALS
TEMPERATURE: 98 F | BODY MASS INDEX: 37.63 KG/M2 | SYSTOLIC BLOOD PRESSURE: 160 MMHG | DIASTOLIC BLOOD PRESSURE: 104 MMHG | OXYGEN SATURATION: 100 % | HEART RATE: 80 BPM | RESPIRATION RATE: 16 BRPM | WEIGHT: 266 LBS

## 2024-07-29 DIAGNOSIS — R06.02 SOB (SHORTNESS OF BREATH): ICD-10-CM

## 2024-07-29 PROCEDURE — 99283 EMERGENCY DEPT VISIT LOW MDM: CPT | Mod: 25

## 2024-08-01 ENCOUNTER — CLINICAL SUPPORT (OUTPATIENT)
Dept: REHABILITATION | Facility: HOSPITAL | Age: 44
End: 2024-08-01
Payer: MEDICAID

## 2024-08-01 DIAGNOSIS — R26.9 GAIT ABNORMALITY: Primary | ICD-10-CM

## 2024-08-01 DIAGNOSIS — M25.60 RANGE OF MOTION DEFICIT: ICD-10-CM

## 2024-08-01 PROCEDURE — 97110 THERAPEUTIC EXERCISES: CPT | Mod: PN

## 2024-08-01 NOTE — PROGRESS NOTES
"[facility]   Physical Therapy Treatment Note     Name: Stephania Quinteros  Clinic Number: [id number]    Therapy Diagnosis:   Encounter Diagnoses   Name Primary?    Gait abnormality Yes    Range of motion deficit      Physician: Order, Paper    Visit Date: 8/1/2024    Physician Orders: PT Eval and Treat  Medical Diagnosis from Referral:   Diagnosis   Z96.649 (ICD-10-CM) - Status post hip replacement      Evaluation Date: 7/22/2024  Authorization Period Expiration: 12/31/2024   Plan of Care Expiration: 9/16/2024  Progress Note Due: 8/22/2024  Visit # / Visits authorized: 1/ 8   FOTO: 1/ 3      FOTO 1st: 56%  Predicted Score: 74%  FOTO 3rd:  FOTO 10th:    Time in: 300pm  Time out: 353pm  Total Billable Time: 53 minutes    Precautions:  Standard , prediabetic       SUBJECTIVE     Pt reports: still can't put her tennis shoes on.    She was compliant with home exercise program.  Response to previous treatment: ongoing  Functional change: ongoing    Pain: 2/10  Location: left hip     OBJECTIVE     Objective Measures updated at progress report unless specified.     Treatment       Stephania received the treatments listed below:      Manual therapy techniques: Joint mobilizations were applied to the: left hip for 5 minutes, including:    Lateral hip distraction with belt grade II    Therapeutic exercises to develop strength, endurance, ROM, flexibility, and posture for 48 minutes including:    Bridges x 30  Nustep level 3 for activity tolerance 10'  Clamshell  Sit to stand 3 x 10  Modified Straight leg raise x 30  Hamstring stretch 4 x 20"   Sled push 20# 3 laps of 10 yards     Patient Education and Home Exercises     Home Exercises Provided and Patient Education Provided     Education provided:   - Home Exercise Program     Written Home Exercises Provided: Patient instructed to cont prior HEP. Exercises were reviewed and Stephania was able to demonstrate them prior to the end of the session.  " Stephania demonstrated good  understanding of the education provided. See EMR under Patient Instructions for exercises provided during therapy sessions    ASSESSMENT     Stephania presented with no hip pain but continued limitations in hip mobility. Included stretches today for that and progressed functional strengthening. Still fearful of hip flexion and hip dislocation. Also noting hip Internal Rotation with ambulation.     Stephania is progressing well towards her goals.     Pt prognosis is Good.     Pt will continue to benefit from skilled outpatient physical therapy to address the deficits listed in the problem list box on initial evaluation, provide pt/family education and to maximize pt's level of independence in the home and community environment.     Pt's spiritual, cultural and educational needs considered and pt agreeable to plan of care and goals.     Anticipated barriers to physical therapy: structural knee valgus     Goals:   Short Term Goals: 4 weeks -Progressing, not met   Patient will be independent in Home Exercise Program to support improving range of motion and strength.   Patient will have improved hip range of motion to equal the uninvolved side.   Patient will have improved hip extension strength by 1/3 MMT.      Long Term Goals: 8 weeks -Progressing, not met   Patient will be independent in progressed Home Exercise Program to support improving range of motion and strength.   Patient goal:   Patient will have improved FOTO score to predicted level to show true functional improvements.   Patient will have strength equal to the uninvolved side to decrease functional gait deviation.     PLAN   Plan of care Certification: 7/22/2024 to 9/16/2024.     Progress as tolerated for hip mobility and strength.     Mary Lin, PT , DPT   Board Certified Clinical Specialist in Orthopedic Physical Therapy

## 2024-08-03 LAB
OHS QRS DURATION: 80 MS
OHS QTC CALCULATION: 437 MS

## 2024-08-15 ENCOUNTER — CLINICAL SUPPORT (OUTPATIENT)
Dept: REHABILITATION | Facility: HOSPITAL | Age: 44
End: 2024-08-15
Payer: MEDICAID

## 2024-08-15 DIAGNOSIS — M25.60 RANGE OF MOTION DEFICIT: ICD-10-CM

## 2024-08-15 DIAGNOSIS — R26.9 GAIT ABNORMALITY: Primary | ICD-10-CM

## 2024-08-15 PROCEDURE — 97110 THERAPEUTIC EXERCISES: CPT | Mod: PN

## 2024-08-15 NOTE — PROGRESS NOTES
"[facility]   Physical Therapy Treatment Note     Name: Stephania Quinteros  Clinic Number: [id number]    Therapy Diagnosis:   Encounter Diagnoses   Name Primary?    Gait abnormality Yes    Range of motion deficit      Physician: Order, Paper    Visit Date: 8/15/2024    Physician Orders: PT Eval and Treat  Medical Diagnosis from Referral:   Diagnosis   Z96.649 (ICD-10-CM) - Status post hip replacement      Evaluation Date: 7/22/2024  Authorization Period Expiration: 12/31/2024   Plan of Care Expiration: 9/16/2024  Progress Note Due: 8/22/2024  Visit # / Visits authorized: 1/ 8   FOTO: 1/ 3      FOTO 1st: 56%  Predicted Score: 74%  FOTO 3rd:  FOTO 10th:    Time in: 300pm  Time out: 353pm  Total Billable Time: 53 minutes    Precautions:  Standard , prediabetic       SUBJECTIVE     Pt reports: still can't put her tennis shoes on.    She was compliant with home exercise program.  Response to previous treatment: ongoing  Functional change: ongoing    Pain: 2/10  Location: left hip     OBJECTIVE     Objective Measures updated at progress report unless specified.     Treatment       Stephania received the treatments listed below:      Manual therapy techniques: Joint mobilizations were applied to the: left hip for 5 minutes, including:    Lateral hip distraction with belt grade II    Therapeutic exercises to develop strength, endurance, ROM, flexibility, and posture for 48 minutes including:    Bridges x 30  Nustep level 3 for activity tolerance 10'  Clamshell  Sit to stand 3 x 10  Modified Straight leg raise x 30  Hamstring stretch 4 x 20"   Sled push 20# 3 laps of 10 yards   Half kneeling hip flexor stretch  Supine quad stretch     Patient Education and Home Exercises     Home Exercises Provided and Patient Education Provided     Education provided:   - Home Exercise Program     Written Home Exercises Provided: Patient instructed to cont prior HEP. Exercises were reviewed and Stephania was " able to demonstrate them prior to the end of the session.  Stephania demonstrated good  understanding of the education provided. See EMR under Patient Instructions for exercises provided during therapy sessions    ASSESSMENT     Stephania presented with no hip pain but continued limitations in hip mobility. Included stretches today for that and progressed functional strengthening. Still fearful of hip flexion and hip dislocation. Also noting hip Internal Rotation with ambulation.    Stephania is progressing well towards her goals.     Pt prognosis is Good.     Pt will continue to benefit from skilled outpatient physical therapy to address the deficits listed in the problem list box on initial evaluation, provide pt/family education and to maximize pt's level of independence in the home and community environment.     Pt's spiritual, cultural and educational needs considered and pt agreeable to plan of care and goals.     Anticipated barriers to physical therapy: structural knee valgus     Goals:   Short Term Goals: 4 weeks -Progressing, not met   Patient will be independent in Home Exercise Program to support improving range of motion and strength.   Patient will have improved hip range of motion to equal the uninvolved side.   Patient will have improved hip extension strength by 1/3 MMT.      Long Term Goals: 8 weeks -Progressing, not met   Patient will be independent in progressed Home Exercise Program to support improving range of motion and strength.   Patient goal:   Patient will have improved FOTO score to predicted level to show true functional improvements.   Patient will have strength equal to the uninvolved side to decrease functional gait deviation.     PLAN   Plan of care Certification: 7/22/2024 to 9/16/2024.     Progress as tolerated for hip mobility and strength.     Mary Lin, PT , DPT   Board Certified Clinical Specialist in Orthopedic Physical Therapy

## 2025-03-24 ENCOUNTER — HOSPITAL ENCOUNTER (EMERGENCY)
Facility: HOSPITAL | Age: 45
Discharge: HOME | End: 2025-03-24
Attending: EMERGENCY MEDICINE
Payer: MEDICAID

## 2025-03-24 VITALS
TEMPERATURE: 99 F | DIASTOLIC BLOOD PRESSURE: 87 MMHG | RESPIRATION RATE: 18 BRPM | BODY MASS INDEX: 36.65 KG/M2 | HEART RATE: 71 BPM | WEIGHT: 256 LBS | SYSTOLIC BLOOD PRESSURE: 145 MMHG | HEIGHT: 70 IN | OXYGEN SATURATION: 100 %

## 2025-03-24 DIAGNOSIS — T78.49XA ALLERGIC REACTION TO HAIR DYE: ICD-10-CM

## 2025-03-24 DIAGNOSIS — T20.15XA SUPERFICIAL BURN OF SCALP, INITIAL ENCOUNTER: Primary | ICD-10-CM

## 2025-03-24 PROCEDURE — 96374 THER/PROPH/DIAG INJ IV PUSH: CPT

## 2025-03-24 PROCEDURE — 96376 TX/PRO/DX INJ SAME DRUG ADON: CPT

## 2025-03-24 PROCEDURE — 63600175 PHARM REV CODE 636 W HCPCS: Mod: TB,JZ

## 2025-03-24 PROCEDURE — 96361 HYDRATE IV INFUSION ADD-ON: CPT

## 2025-03-24 PROCEDURE — 96375 TX/PRO/DX INJ NEW DRUG ADDON: CPT

## 2025-03-24 PROCEDURE — 99284 EMERGENCY DEPT VISIT MOD MDM: CPT | Mod: 25

## 2025-03-24 PROCEDURE — 25000003 PHARM REV CODE 250

## 2025-03-24 RX ORDER — METHYLPREDNISOLONE SOD SUCC 125 MG
125 VIAL (EA) INJECTION
Status: COMPLETED | OUTPATIENT
Start: 2025-03-24 | End: 2025-03-24

## 2025-03-24 RX ORDER — PREDNISONE 20 MG/1
TABLET ORAL
Qty: 5 TABLET | Refills: 0 | Status: SHIPPED | OUTPATIENT
Start: 2025-03-24 | End: 2025-03-29

## 2025-03-24 RX ORDER — DIPHENHYDRAMINE HYDROCHLORIDE 50 MG/ML
25 INJECTION, SOLUTION INTRAMUSCULAR; INTRAVENOUS
Status: COMPLETED | OUTPATIENT
Start: 2025-03-24 | End: 2025-03-24

## 2025-03-24 RX ORDER — NAPROXEN 500 MG/1
500 TABLET ORAL 2 TIMES DAILY PRN
Qty: 30 TABLET | Refills: 0 | Status: SHIPPED | OUTPATIENT
Start: 2025-03-24

## 2025-03-24 RX ORDER — KETOROLAC TROMETHAMINE 30 MG/ML
15 INJECTION, SOLUTION INTRAMUSCULAR; INTRAVENOUS
Status: COMPLETED | OUTPATIENT
Start: 2025-03-24 | End: 2025-03-24

## 2025-03-24 RX ORDER — HYDROXYZINE HYDROCHLORIDE 25 MG/1
25 TABLET, FILM COATED ORAL 4 TIMES DAILY PRN
Qty: 30 TABLET | Refills: 0 | Status: SHIPPED | OUTPATIENT
Start: 2025-03-24

## 2025-03-24 RX ADMIN — SODIUM CHLORIDE 1000 ML: 9 INJECTION, SOLUTION INTRAVENOUS at 01:03

## 2025-03-24 RX ADMIN — METHYLPREDNISOLONE SODIUM SUCCINATE 125 MG: 125 INJECTION, POWDER, FOR SOLUTION INTRAMUSCULAR; INTRAVENOUS at 01:03

## 2025-03-24 RX ADMIN — DIPHENHYDRAMINE HYDROCHLORIDE 25 MG: 50 INJECTION INTRAMUSCULAR; INTRAVENOUS at 01:03

## 2025-03-24 RX ADMIN — DIPHENHYDRAMINE HYDROCHLORIDE 25 MG: 50 INJECTION INTRAMUSCULAR; INTRAVENOUS at 02:03

## 2025-03-24 RX ADMIN — KETOROLAC TROMETHAMINE 15 MG: 30 INJECTION, SOLUTION INTRAMUSCULAR at 02:03

## 2025-03-24 NOTE — ED PROVIDER NOTES
"Encounter Date: 3/24/2025       History     Chief Complaint   Patient presents with    Burn     Pt states she has a chemical burn from hair dye on scalp. Pt states he scalp is "leaking fluid."      44-year-old female with a past medical history of AIDS and HIV presents to ED for chemical burn.  Patient states 2 days ago she tried a new over-the-counter hair dye which burned her scalp.  Patient complaining of a burning itching pain.  She tried Benadryl with no relief.  Patient also admits to swelling, fluid leaking discharge, and headache.  Denies fever, nausea, vomiting, SOB, dizziness, lightheadedness.      Review of patient's allergies indicates:   Allergen Reactions    Iodinated contrast media Hives and Itching     Past Medical History:   Diagnosis Date    AIDS     HIV (human immunodeficiency virus infection)      Past Surgical History:   Procedure Laterality Date     SECTION      ESOPHAGOGASTRODUODENOSCOPY N/A 2022    Procedure: ESOPHAGOGASTRODUODENOSCOPY (EGD);  Surgeon: Zahira Jiang MD;  Location: [facility];  Service: Endoscopy;  Laterality: N/A;    HYSTERECTOMY       No family history on file.  Social History[1]  Review of Systems   Constitutional:  Negative for fever.   Respiratory:  Negative for shortness of breath.    Gastrointestinal:  Negative for nausea and vomiting.   Skin:         (+) scalp burn   Neurological:  Positive for headaches. Negative for dizziness and light-headedness.       Physical Exam     Initial Vitals [25 1226]   BP Pulse Resp Temp SpO2   112/70 82 17 98.2 °F (36.8 °C) 100 %      MAP       --         Physical Exam    Nursing note and vitals reviewed.  Constitutional: She appears well-developed and well-nourished. She is not diaphoretic. She is active. She does not appear ill. No distress.   HENT:   Head: Normocephalic and atraumatic.   Right Ear: External ear normal.   Left Ear: External ear normal.   Nose: Nose normal.   Erythematous scalp with mild swelling " and minimal blistering noted throughout. Blanchable. No sloughing   Eyes: Conjunctivae, EOM and lids are normal. Pupils are equal, round, and reactive to light. Right eye exhibits no discharge. Left eye exhibits no discharge.   Neck: Neck supple.   Normal range of motion.  Pulmonary/Chest: Effort normal. No respiratory distress.   Abdominal: She exhibits no distension.   Musculoskeletal:         General: Normal range of motion.      Cervical back: Normal range of motion and neck supple.     Neurological: She is alert and oriented to person, place, and time. She has normal strength. No sensory deficit. GCS eye subscore is 4. GCS verbal subscore is 5. GCS motor subscore is 6.   Skin: Skin is dry. Capillary refill takes less than 2 seconds.         ED Course   Procedures  Labs Reviewed - No data to display       Imaging Results    None          Medications   diphenhydrAMINE injection 25 mg (25 mg Intravenous Given 3/24/25 1312)   sodium chloride 0.9% bolus 1,000 mL 1,000 mL (1,000 mLs Intravenous New Bag 3/24/25 1311)   methylPREDNISolone sodium succinate injection 125 mg (125 mg Intravenous Given 3/24/25 1312)   diphenhydrAMINE injection 25 mg (25 mg Intravenous Given 3/24/25 1418)   ketorolac injection 15 mg (15 mg Intravenous Given 3/24/25 1418)     Medical Decision Making  44-year-old female with a past medical history of AIDS and HIV presents to ED for chemical burn.   Patient's chart and medical history reviewed.    ddX:  1st-degree burn  2nd degree partial degree burn  2nd-degree deep degree burn  3rd-degree burn  4th degree burn    Patient's vitals reviewed.  Afebrile, no respiratory distress, and nontoxic-appearing in the ED. Patient had an erythematous scalp with mild swelling and minimal blistering noted throughout. Blanchable. No sloughing. With shared decision-making we will do IV cocktail today.  Patient given Benadryl, fluids, and Solu-Medrol.  Patient is still complaining of pain and itching.  Patient  given another dose of Toradol and Benadryl.   Discussed this case with Dr. Ta. Discussed with patient to continue with copious hair washing as well as avoiding this hair dye in the future, she verbalized understanding.  Patient is sent home with short course of steroids, naproxen, and hydroxyzine for symptomatic control.  Patient will follow-up with her PCP. Patient agrees with this plan. Discussed with her strict return precautions, she verbalized understanding. Patient is stable for discharge.     Amount and/or Complexity of Data Reviewed  External Data Reviewed: labs, radiology and notes.    Risk  Prescription drug management.               ED Course as of 03/24/25 1505   Mon Mar 24, 2025   1458 BP(!): 144/98 [EF]   1458 BP: 112/70 [EF]   1458 MAP (mmHg): 117 [EF]   1458 Temp: 98.2 °F (36.8 °C) [EF]   1458 Pulse: 70 [EF]   1458 Pulse: 82 [EF]   1458 Resp: 17 [EF]   1458 SpO2: 100 % [EF]      ED Course User Index  [EF] Quentin Ta MD                           Clinical Impression:  Final diagnoses:  [T20.15XA] Superficial burn of scalp, initial encounter (Primary)  [T78.49XA] Allergic reaction to hair dye          ED Disposition Condition    Discharge Stable          ED Prescriptions       Medication Sig Dispense Start Date End Date Auth. Provider    hydrOXYzine HCL (ATARAX) 25 MG tablet Take 1 tablet (25 mg total) by mouth 4 (four) times daily as needed for Itching. 30 tablet 3/24/2025 -- Holdsworth, Alayna, PA-C    predniSONE (DELTASONE) 20 MG tablet Take 2 tablets (40 mg total) by mouth once daily for 1 day, THEN 1 tablet (20 mg total) once daily for 2 days, THEN 0.5 tablets (10 mg total) once daily for 2 days. 5 tablet 3/24/2025 3/29/2025 Holdsworth, Alayna, PA-C    naproxen (NAPROSYN) 500 MG tablet Take 1 tablet (500 mg total) by mouth 2 (two) times daily as needed (Pain). 30 tablet 3/24/2025 -- Holdsworth, Alayna, PA-C          Follow-up Information       Follow up With Specialties Details Why  Contact Info    Margaret Holder MD Internal Medicine Call   [address]  [facility] [address]  [phone]                   [1]   Social History  Tobacco Use    Smoking status: Every Day     Current packs/day: 0.25     Types: Cigarettes    Smokeless tobacco: Never   Substance Use Topics    Alcohol use: No    Drug use: Not Currently     Types: IV     Comment: mirror, spoon and syringe, last use 4 yrs ago (patient stated)        Holdsworth, Alayna, PA-C  03/24/25 8502

## 2025-03-24 NOTE — DISCHARGE INSTRUCTIONS

## 2025-04-15 ENCOUNTER — HOSPITAL ENCOUNTER (EMERGENCY)
Facility: HOSPITAL | Age: 45
Discharge: HOME | End: 2025-04-15
Attending: EMERGENCY MEDICINE
Payer: MEDICAID

## 2025-04-15 ENCOUNTER — OFFICE VISIT (OUTPATIENT)
Dept: URGENT CARE | Facility: CLINIC | Age: 45
End: 2025-04-15
Payer: MEDICAID

## 2025-04-15 VITALS
RESPIRATION RATE: 19 BRPM | HEART RATE: 78 BPM | OXYGEN SATURATION: 98 % | WEIGHT: 256 LBS | HEIGHT: 71 IN | TEMPERATURE: 99 F | BODY MASS INDEX: 35.84 KG/M2 | SYSTOLIC BLOOD PRESSURE: 138 MMHG | DIASTOLIC BLOOD PRESSURE: 83 MMHG

## 2025-04-15 VITALS
RESPIRATION RATE: 16 BRPM | DIASTOLIC BLOOD PRESSURE: 110 MMHG | SYSTOLIC BLOOD PRESSURE: 152 MMHG | BODY MASS INDEX: 36.65 KG/M2 | HEART RATE: 77 BPM | OXYGEN SATURATION: 100 % | WEIGHT: 256 LBS | TEMPERATURE: 98 F | HEIGHT: 70 IN

## 2025-04-15 DIAGNOSIS — R11.0 NAUSEA: ICD-10-CM

## 2025-04-15 DIAGNOSIS — R10.13 EPIGASTRIC PAIN: ICD-10-CM

## 2025-04-15 DIAGNOSIS — B20 CURRENTLY ASYMPTOMATIC HIV INFECTION, WITH HISTORY OF HIV-RELATED ILLNESS: ICD-10-CM

## 2025-04-15 DIAGNOSIS — K44.9 HIATAL HERNIA: ICD-10-CM

## 2025-04-15 DIAGNOSIS — R53.83 FATIGUE, UNSPECIFIED TYPE: Primary | ICD-10-CM

## 2025-04-15 LAB
ABSOLUTE EOSINOPHIL (SMH): 0.23 K/UL
ABSOLUTE MONOCYTE (SMH): 0.51 K/UL (ref 0.3–1)
ABSOLUTE NEUTROPHIL COUNT (SMH): 1.8 K/UL (ref 1.8–7.7)
ALBUMIN SERPL-MCNC: 3.3 G/DL (ref 3.5–5.2)
ALP SERPL-CCNC: 146 UNIT/L (ref 40–150)
ALT SERPL-CCNC: 37 UNIT/L (ref 10–44)
ANION GAP (SMH): 10 MMOL/L (ref 8–16)
AST SERPL-CCNC: 52 UNIT/L (ref 11–45)
BASOPHILS # BLD AUTO: 0.03 K/UL
BASOPHILS NFR BLD AUTO: 0.6 %
BILIRUB SERPL-MCNC: 0.4 MG/DL (ref 0.1–1)
BILIRUB UR QL STRIP.AUTO: NEGATIVE
BILIRUB UR QL STRIP: NEGATIVE
BUN SERPL-MCNC: 14 MG/DL (ref 6–20)
CALCIUM SERPL-MCNC: 8.7 MG/DL (ref 8.7–10.5)
CHLORIDE SERPL-SCNC: 106 MMOL/L (ref 95–110)
CLARITY UR: CLEAR
CO2 SERPL-SCNC: 25 MMOL/L (ref 23–29)
COLOR UR AUTO: YELLOW
CREAT SERPL-MCNC: 1.1 MG/DL (ref 0.5–1.4)
CTP QC/QA: YES
ERYTHROCYTE [DISTWIDTH] IN BLOOD BY AUTOMATED COUNT: 16.6 % (ref 11.5–14.5)
FLUAV AG NPH QL: NEGATIVE
FLUBV AG NPH QL: NEGATIVE
GFR SERPLBLD CREATININE-BSD FMLA CKD-EPI: >60 ML/MIN/1.73/M2
GLUCOSE SERPL-MCNC: 105 MG/DL (ref 70–110)
GLUCOSE SERPL-MCNC: 82 MG/DL (ref 70–110)
GLUCOSE UR QL STRIP: NEGATIVE
GLUCOSE UR QL STRIP: NEGATIVE
GROUP A STREP MOLECULAR (OHS): NEGATIVE
HCT VFR BLD AUTO: 36.5 % (ref 37–48.5)
HGB BLD-MCNC: 11.4 GM/DL (ref 12–16)
HGB UR QL STRIP: NEGATIVE
IMM GRANULOCYTES # BLD AUTO: 0.01 K/UL (ref 0–0.04)
IMM GRANULOCYTES NFR BLD AUTO: 0.2 % (ref 0–0.5)
KETONES UR QL STRIP: NEGATIVE
KETONES UR QL STRIP: NEGATIVE
LACTATE SERPL-SCNC: 0.7 MMOL/L (ref 0.5–2.2)
LEUKOCYTE ESTERASE UR QL STRIP: NEGATIVE
LEUKOCYTE ESTERASE UR QL STRIP: NEGATIVE
LYMPHOCYTES # BLD AUTO: 2.43 K/UL (ref 1–4.8)
MCH RBC QN AUTO: 26.5 PG (ref 27–31)
MCHC RBC AUTO-ENTMCNC: 31.2 G/DL (ref 32–36)
MCV RBC AUTO: 85 FL (ref 82–98)
NITRITE UR QL STRIP: NEGATIVE
NUCLEATED RBC (/100WBC) (SMH): 0 /100 WBC
PH UR STRIP: 8 [PH]
PH, POC UA: 7
PLATELET # BLD AUTO: 259 K/UL (ref 150–450)
PMV BLD AUTO: 10 FL (ref 9.2–12.9)
POC BLOOD, URINE: NEGATIVE
POC NITRATES, URINE: NEGATIVE
POTASSIUM SERPL-SCNC: 3.8 MMOL/L (ref 3.5–5.1)
PROT SERPL-MCNC: 7.2 GM/DL (ref 6–8.4)
PROT UR QL STRIP: NEGATIVE
PROT UR QL STRIP: NEGATIVE
RBC # BLD AUTO: 4.31 M/UL (ref 4–5.4)
RELATIVE EOSINOPHIL (SMH): 4.6 % (ref 0–8)
RELATIVE LYMPHOCYTE (SMH): 48.3 % (ref 18–48)
RELATIVE MONOCYTE (SMH): 10.1 % (ref 4–15)
RELATIVE NEUTROPHIL (SMH): 36.2 % (ref 38–73)
S PYO RRNA THROAT QL PROBE: NEGATIVE
SARS-COV-2 RDRP RESP QL NAA+PROBE: NEGATIVE
SODIUM SERPL-SCNC: 141 MMOL/L (ref 136–145)
SP GR UR STRIP: 1.01 (ref 1–1.03)
SP GR UR STRIP: 1.02
UROBILINOGEN UR STRIP-ACNC: NEGATIVE (ref 0.1–1.1)
UROBILINOGEN UR STRIP-ACNC: NEGATIVE EU/DL
WBC # BLD AUTO: 5.03 K/UL (ref 3.9–12.7)

## 2025-04-15 PROCEDURE — 96375 TX/PRO/DX INJ NEW DRUG ADDON: CPT

## 2025-04-15 PROCEDURE — 96374 THER/PROPH/DIAG INJ IV PUSH: CPT

## 2025-04-15 PROCEDURE — 83605 ASSAY OF LACTIC ACID: CPT | Performed by: PHYSICIAN ASSISTANT

## 2025-04-15 PROCEDURE — 87651 STREP A DNA AMP PROBE: CPT | Performed by: PHYSICIAN ASSISTANT

## 2025-04-15 PROCEDURE — 63600175 PHARM REV CODE 636 W HCPCS: Performed by: PHYSICIAN ASSISTANT

## 2025-04-15 PROCEDURE — 87880 STREP A ASSAY W/OPTIC: CPT | Mod: QW,,, | Performed by: NURSE PRACTITIONER

## 2025-04-15 PROCEDURE — 87804 INFLUENZA ASSAY W/OPTIC: CPT | Mod: QW,,, | Performed by: NURSE PRACTITIONER

## 2025-04-15 PROCEDURE — 85025 COMPLETE CBC W/AUTO DIFF WBC: CPT | Performed by: PHYSICIAN ASSISTANT

## 2025-04-15 PROCEDURE — 99285 EMERGENCY DEPT VISIT HI MDM: CPT | Mod: 25

## 2025-04-15 PROCEDURE — 36415 COLL VENOUS BLD VENIPUNCTURE: CPT | Performed by: PHYSICIAN ASSISTANT

## 2025-04-15 PROCEDURE — 99215 OFFICE O/P EST HI 40 MIN: CPT | Mod: S$GLB,,, | Performed by: NURSE PRACTITIONER

## 2025-04-15 PROCEDURE — 71046 X-RAY EXAM CHEST 2 VIEWS: CPT | Mod: S$GLB,,, | Performed by: RADIOLOGY

## 2025-04-15 PROCEDURE — 93000 ELECTROCARDIOGRAM COMPLETE: CPT | Mod: S$GLB,,, | Performed by: NURSE PRACTITIONER

## 2025-04-15 PROCEDURE — 82962 GLUCOSE BLOOD TEST: CPT | Mod: ,,, | Performed by: NURSE PRACTITIONER

## 2025-04-15 PROCEDURE — 25000003 PHARM REV CODE 250: Performed by: PHYSICIAN ASSISTANT

## 2025-04-15 PROCEDURE — 96361 HYDRATE IV INFUSION ADD-ON: CPT

## 2025-04-15 PROCEDURE — 81003 URINALYSIS AUTO W/O SCOPE: CPT | Performed by: PHYSICIAN ASSISTANT

## 2025-04-15 PROCEDURE — 81003 URINALYSIS AUTO W/O SCOPE: CPT | Mod: QW,S$GLB,, | Performed by: NURSE PRACTITIONER

## 2025-04-15 PROCEDURE — 80053 COMPREHEN METABOLIC PANEL: CPT | Performed by: PHYSICIAN ASSISTANT

## 2025-04-15 PROCEDURE — 87635 SARS-COV-2 COVID-19 AMP PRB: CPT | Mod: QW,S$GLB,, | Performed by: NURSE PRACTITIONER

## 2025-04-15 RX ORDER — FAMOTIDINE 10 MG/ML
20 INJECTION, SOLUTION INTRAVENOUS
Status: COMPLETED | OUTPATIENT
Start: 2025-04-15 | End: 2025-04-15

## 2025-04-15 RX ORDER — ONDANSETRON HYDROCHLORIDE 2 MG/ML
4 INJECTION, SOLUTION INTRAVENOUS
Status: COMPLETED | OUTPATIENT
Start: 2025-04-15 | End: 2025-04-15

## 2025-04-15 RX ADMIN — FAMOTIDINE 20 MG: 10 INJECTION, SOLUTION INTRAVENOUS at 07:04

## 2025-04-15 RX ADMIN — ONDANSETRON 4 MG: 2 INJECTION INTRAMUSCULAR; INTRAVENOUS at 07:04

## 2025-04-15 RX ADMIN — SODIUM CHLORIDE 1000 ML: 9 INJECTION, SOLUTION INTRAVENOUS at 07:04

## 2025-04-15 NOTE — PATIENT INSTRUCTIONS
Discussed with patient her fatigue could be caused by any number of things however she is negative for the following:  Covid  Flu  Strep  UA   EKG within normal limits    Instructed patient to go to the emergency room for further evaluation and management

## 2025-04-15 NOTE — PROGRESS NOTES
"Subjective:      Patient ID: Stephania Quinteros is a 44 y.o. female.    Vitals:  height is 5' 10.5" (1.791 m) and weight is 116.1 kg (256 lb). Her oral temperature is 98.5 °F (36.9 °C). Her blood pressure is 138/83 and her pulse is 78. Her respiration is 19 and oxygen saturation is 98%.     Chief Complaint: Headache    44-year-old female with a history of HIV and an age-related illness is seen today with complaints of feeling bad for past 3 days.  Patient states she has been fatigued, sleeping a lot, and generalized weakness.  Patient denies having any URI symptoms such as a runny nose, nasal congestion, cough, sore throat, or a cough.  Patient does report having some nausea and reflux at night.  Patient states she had similar symptoms to this prior to having a hernia surgery.  Patient also denies having any urinary symptoms such as dysuria frequency urgency, or hematuria.  Patient denies having back pain or fever.  Followed by Infectious Disease every 3 months and states that her labs or undetected.    Headache   This is a new problem. Episode onset: 3 days. The problem has been gradually worsening. The pain does not radiate. The pain quality is not similar to prior headaches. The quality of the pain is described as aching. The pain is at a severity of 6/10. The pain is moderate. Associated symptoms include nausea. Pertinent negatives include no abdominal pain, coughing, dizziness, ear pain, fever, neck pain, sore throat, vomiting or weakness. Associated symptoms comments: fatigue. Nothing aggravates the symptoms. She has tried nothing for the symptoms. The treatment provided no relief.       Constitution: Positive for fatigue. Negative for chills, sweating and fever.   HENT:  Negative for ear pain, ear discharge, congestion, sore throat and trouble swallowing.    Neck: Negative for neck pain and neck stiffness.   Cardiovascular: Negative.    Eyes: Negative.    Respiratory:  Positive for shortness of breath. " Negative for cough, sputum production and wheezing.    Gastrointestinal:  Positive for nausea and heartburn. Negative for abdominal pain, vomiting, constipation and diarrhea.   Endocrine: negative.   Genitourinary:  Negative for dysuria, frequency, urgency and hematuria.   Musculoskeletal: Negative.    Skin: Negative.    Allergic/Immunologic: Negative.    Neurological:  Positive for headaches. Negative for dizziness.   Hematologic/Lymphatic: Negative.    Psychiatric/Behavioral: Negative.        Objective:     Physical Exam   Constitutional: She is oriented to person, place, and time. She appears well-developed. She is cooperative.  Non-toxic appearance. She appears ill. No distress. normal  HENT:   Head: Normocephalic and atraumatic.   Ears:   Right Ear: Hearing, tympanic membrane, external ear and ear canal normal.   Left Ear: Hearing, tympanic membrane, external ear and ear canal normal.   Nose: Nose normal. No mucosal edema, rhinorrhea, nasal deformity or congestion. No epistaxis. Right sinus exhibits no maxillary sinus tenderness and no frontal sinus tenderness. Left sinus exhibits no maxillary sinus tenderness and no frontal sinus tenderness.   Mouth/Throat: Uvula is midline and mucous membranes are normal. Mucous membranes are moist. No trismus in the jaw. Normal dentition. No uvula swelling. No oropharyngeal exudate or posterior oropharyngeal erythema.   Eyes: Conjunctivae and lids are normal. Pupils are equal, round, and reactive to light. Extraocular movement intact   Neck: Trachea normal and phonation normal. Neck supple. No neck rigidity present.   Cardiovascular: Normal rate, regular rhythm, normal heart sounds and normal pulses.   Pulmonary/Chest: Effort normal and breath sounds normal.   Abdominal: Normal appearance and bowel sounds are normal. She exhibits no distension. Soft. There is no abdominal tenderness. There is no rebound and no guarding.   Musculoskeletal: Normal range of motion.          General: Normal range of motion.      Cervical back: She exhibits no tenderness.   Lymphadenopathy:     She has no cervical adenopathy.   Neurological: no focal deficit. She is alert, oriented to person, place, and time and at baseline. She displays no weakness and normal reflexes. No cranial nerve deficit or sensory deficit. She exhibits normal muscle tone. Coordination and gait normal.   Skin: Skin is warm, dry, intact and not diaphoretic. Capillary refill takes less than 2 seconds.   Psychiatric: Her speech is normal and behavior is normal. Mood, judgment and thought content normal.      Comments: Flat affect   Nursing note and vitals reviewed.      Assessment:     1. Fatigue, unspecified type    2. Nausea        Plan:       Fatigue, unspecified type  -     POCT Urinalysis, Dipstick, Manual, W/O Scope  -     POCT COVID-19 Rapid Screening  -     XR CHEST PA AND LATERAL; Future; Expected date: 04/15/2025  -     POCT Influenza A/B Rapid Antigen  -     POCT rapid strep A  -     POCT Glucose, Hand-Held Device  -     IN OFFICE EKG 12-LEAD (to Muse)    Nausea  -     POCT Urinalysis, Dipstick, Manual, W/O Scope          Medical Decision Making:   EKG normal sinus rhythm, rate 70, no acute ST or NM interval changes seen.  Point of care testing such as UA, flu, COVID, and strep were negative  Glucose 105  Chest Xray appears normal in comparison to a previous one in Sept. 2024  Discussed findings with patient instructed her to go to the emergency room considering her medical history for further evaluation and management.    Sent a message via secure chat to the charge nurse (Naz) and the nurse practitioner (Zenon) regarding this patient.

## 2025-04-15 NOTE — ED NOTES
Stephania Quinteros presents to the ED with c/o fatigue for the past 3 days. Patient reports going to urgent care who suggested that she come to the ER. Other symptoms are nausea brittle nails and difficulty sleeping.   Mucous membranes are pink and moist. Skin is warm, dry and intact.  CHUCHO COLON  Verified patient's name and date of birth.

## 2025-04-16 NOTE — ED NOTES
Upon discharge, patient is AAOx4, no cardiac or respiratory complications. Follow up care reviewed with patient and has been instructed to return to the ER if needed. Patient verbalized understanding and ambulated to the lobby without difficulty. DARLENE RANKIN  AVS has been signed by the patient in the chart.

## 2025-04-16 NOTE — ED PROVIDER NOTES
Encounter Date: 4/15/2025       History     Chief Complaint   Patient presents with    Fatigue     X 3 days     Stephania Quinteros is a 44 y.o. female presenting for evaluation of persisting and worsening fatigue for the last several days.  She was evaluated at urgent care and sent here to the emergency department for further evaluation.  She has a history of previous hiatal hernia repair with mesh, performed in .  She has noticed some recurrent upper abdominal pain and nausea and vomiting.  She is concerned that her mesh is out of place.  No difficulty breathing or shortness of breath.  No fever, no chills.  No dysuria or hematuria.  She has been taking her medication as prescribed.  She has a past medical history of AIDS and HIV (human immunodeficiency virus infection).      The history is provided by the patient.     Review of patient's allergies indicates:   Allergen Reactions    Tramadol     Iodinated contrast media Hives and Itching     Past Medical History:   Diagnosis Date    AIDS     HIV (human immunodeficiency virus infection)      Past Surgical History:   Procedure Laterality Date     SECTION      ESOPHAGOGASTRODUODENOSCOPY N/A 2022    Procedure: ESOPHAGOGASTRODUODENOSCOPY (EGD);  Surgeon: Zahira Jiang MD;  [facility]: [facility];  Service: Endoscopy;  Laterality: N/A;    HYSTERECTOMY       No family history on file.  Social History[1]  Review of Systems   Constitutional:  Positive for fatigue. Negative for chills and fever.   HENT:  Negative for facial swelling and trouble swallowing.    Respiratory:  Negative for cough, chest tightness, shortness of breath and wheezing.    Cardiovascular:  Negative for chest pain and palpitations.   Gastrointestinal:  Positive for abdominal pain, nausea and vomiting. Negative for diarrhea.   Genitourinary:  Negative for dysuria and hematuria.   Musculoskeletal:  Negative for arthralgias, back pain, joint swelling, myalgias, neck pain and neck  stiffness.   Skin:  Negative for color change, pallor, rash and wound.   Neurological:  Negative for dizziness, syncope, weakness, light-headedness, numbness and headaches.   Hematological:  Does not bruise/bleed easily.   Psychiatric/Behavioral:  The patient is not nervous/anxious.        Physical Exam     Initial Vitals [04/15/25 1618]   BP Pulse Resp Temp SpO2   (!) 164/95 74 18 97.6 °F (36.4 °C) 98 %      MAP       --         Physical Exam    Nursing note and vitals reviewed.  Constitutional: She appears well-developed and well-nourished. She is not diaphoretic. No distress.   HENT:   Head: Normocephalic and atraumatic.   Right Ear: External ear normal.   Left Ear: External ear normal.   Nose: Nose normal. Mouth/Throat: Oropharynx is clear and moist.   Eyes: Conjunctivae and EOM are normal. Pupils are equal, round, and reactive to light.   Neck: Neck supple.   Normal range of motion.  Cardiovascular:  Normal rate, regular rhythm, normal heart sounds and intact distal pulses.           Pulmonary/Chest: Breath sounds normal. No respiratory distress. She has no wheezes. She has no rhonchi. She has no rales.   Abdominal: Abdomen is soft. She exhibits no distension and no mass. There is no abdominal tenderness.   Musculoskeletal:         General: No tenderness or edema. Normal range of motion.      Cervical back: Normal range of motion and neck supple.     Neurological: She is alert and oriented to person, place, and time. She has normal strength. No cranial nerve deficit or sensory deficit. GCS score is 15. GCS eye subscore is 4. GCS verbal subscore is 5. GCS motor subscore is 6.   No focal neurological deficits noted.  Cranial nerves III-XII grossly intact.  Equal strength and sensation noted to bilateral upper and lower extremities.     Skin: Skin is warm and dry. No rash and no abscess noted. No erythema.   Psychiatric: She has a normal mood and affect.         ED Course   Procedures  Labs Reviewed    COMPREHENSIVE METABOLIC PANEL - Abnormal       Result Value    Sodium 141      Potassium 3.8      Chloride 106      CO2 25      Glucose 82      BUN 14      Creatinine 1.1      Calcium 8.7      Protein Total 7.2      Albumin 3.3 (*)     Bilirubin Total 0.4            AST 52 (*)     ALT 37      Anion Gap 10      eGFR >60     CBC WITH DIFFERENTIAL - Abnormal    WBC 5.03      RBC 4.31      Hgb 11.4 (*)     Hct 36.5 (*)     MCV 85      MCH 26.5 (*)     MCHC 31.2 (*)     RDW 16.6 (*)     Platelet Count 259      MPV 10.0      Nucleated RBC 0      Neut % 36.2 (*)     Lymph % 48.3 (*)     Mono % 10.1      Eos % 4.6      Basophil % 0.6      Imm Grans % 0.2      Neut # 1.8      Lymph # 2.43      Mono # 0.51      Eos # 0.23      Baso # 0.03      Imm Grans # 0.01     GROUP A STREP, MOLECULAR - Normal    Group A Strep Molecular Negative     LACTIC ACID, PLASMA - Normal    Lactic Acid Level 0.7      Narrative:     Falsely low lactic acid results can be found in samples containing >=13.0 mg/dL total bilirubin and/or >=3.5 mg/dL direct bilirubin.    URINALYSIS, REFLEX TO URINE CULTURE - Normal    Color, UA Yellow      Appearance, UA Clear      Spec Grav UA 1.020      pH, UA 8.0      Protein, UA Negative      Glucose, UA Negative      Ketones, UA Negative      Blood, UA Negative      Bilirubin, UA Negative      Urobilinogen, UA Negative      Nitrites, UA Negative      Leukocyte Esterase, UA Negative     CBC W/ AUTO DIFFERENTIAL    Narrative:     The following orders were created for panel order CBC auto differential.  Procedure                               Abnormality         Status                     ---------                               -----------         ------                     CBC with Differential[[id number]]       Abnormal            Final result                 Please view results for these tests on the individual orders.          Imaging Results              CT Abdomen Pelvis  Without Contrast (Final result)   Result time 04/15/25 19:57:29      Final result by Hever Jung DO (04/15/25 19:57:29)                   Impression:      No acute abnormality of the abdomen or pelvis.    Punctate nonobstructing bilateral nephrolithiasis.  No hydronephrosis.      Electronically signed by: Hever Jung  Date:    04/15/2025  Time:    19:57               Narrative:    EXAMINATION:  CT ABDOMEN PELVIS WITHOUT CONTRAST    CLINICAL HISTORY:  Epigastric pain;prior mesh surgery for hernia;    TECHNIQUE:  Multiplanar images were obtained of the abdomen and pelvis from the hemidiaphragms through the symphysis pubis without intravenous contrast.    COMPARISON:  CT abdomen and pelvis from 06/06/2024.    FINDINGS:  Lung Bases: Clear.    Heart: Heart size is normal.  No pericardial effusion.    Liver: The liver is enlarged.  There is no focal hepatic lesion.    Biliary tract: No intrahepatic or extrahepatic biliary ductal dilatation.    Gallbladder: No radiodense gallstone. No wall thickening or pericholecystic fluid.    Pancreas: Normal. No pancreatic ductal dilatation.    Spleen: Normal size without focal lesion.    Adrenals: Normal.    Kidneys and urinary collecting systems: There are multiple nonobstructing punctate bilateral renal calculi.  There is no hydronephrosis or obstructing ureteral stone.    Lymph nodes: None enlarged.    Stomach and bowel: There is a small hiatal hernia.  Loops of small and large bowel are normal in caliber without evidence for inflammation or obstruction.  The appendix is normal.    Peritoneum and mesentery: No ascites or free intraperitoneal air. No abdominal fluid collection.    Vasculature: Normal.    Urinary bladder: Normal.    Reproductive organs: The uterus is absent.    Body wall: There are small fat containing anterior abdominal wall hernias.    Musculoskeletal: No aggressive osseous lesion.  There are postoperative changes of left total hip arthroplasty.  Resultant streak artifact                                        Medications   sodium chloride 0.9% bolus 1,000 mL 1,000 mL (0 mLs Intravenous Stopped 4/15/25 2019)   famotidine (PF) injection 20 mg (20 mg Intravenous Given 4/15/25 1910)   ondansetron injection 4 mg (4 mg Intravenous Given 4/15/25 1910)     Medical Decision Making  Differential Diagnosis:   Gastritis   Hiatal Hernia   Acute Abdomen   Viral Syndrome   UTI     Pt emergently evaluated here in the ED.    Patient tested negative for influenza, COVID-19 at urgent Care.  Rapid strep is negative.  Urinalysis is negative.  Labs are stable without leukocytosis.  Normal lactic acid.  Normal electrolytes, renal function and LFTs.  CT abdomen and pelvis shows no evidence for acute intra-abdominal processes.  She is made aware of the findings.  We feel comfortable discharging her home to follow up with her primary care provider for re-evaluation and further management.  She voices understanding and is agreeable with the plan.  She is given specific return precautions.    Amount and/or Complexity of Data Reviewed  Labs: ordered. Decision-making details documented in ED Course.  Radiology: ordered. Decision-making details documented in ED Course.    Risk  OTC drugs.  Prescription drug management.                                      Clinical Impression:  Final diagnoses:  [R53.83] Fatigue, unspecified type (Primary)  [R10.13] Epigastric pain  [K44.9] Hiatal hernia          ED Disposition Condition    Discharge Stable          ED Prescriptions    None       Follow-up Information       Follow up With Specialties Details Why Contact Info Additional Information    [facility] -  Emergency Medicine  As needed, If symptoms worsen [address] [place] [address] [address] [place] [phone] 1st floor               [1]   Social History  Tobacco Use    Smoking status: Every Day     Current packs/day: 0.25     Types: Cigarettes    Smokeless tobacco: Never   Substance Use Topics    Alcohol use: No    Drug use: Not  Currently     Types: IV     Comment: mirror, spoon and syringe, last use 4 yrs ago (patient stated)        Chacha Yabrrough PA-C  04/15/25 2023

## 2025-07-18 ENCOUNTER — HOSPITAL ENCOUNTER (EMERGENCY)
Facility: HOSPITAL | Age: 45
Discharge: HOME | End: 2025-07-19
Attending: EMERGENCY MEDICINE
Payer: MEDICAID

## 2025-07-18 DIAGNOSIS — R53.83 FATIGUE, UNSPECIFIED TYPE: Primary | ICD-10-CM

## 2025-07-18 DIAGNOSIS — R60.0 PERIPHERAL EDEMA: ICD-10-CM

## 2025-07-18 DIAGNOSIS — R53.83 FATIGUE: ICD-10-CM

## 2025-07-18 DIAGNOSIS — R51.9 ACUTE NONINTRACTABLE HEADACHE, UNSPECIFIED HEADACHE TYPE: ICD-10-CM

## 2025-07-18 PROCEDURE — 99285 EMERGENCY DEPT VISIT HI MDM: CPT | Mod: 25

## 2025-07-19 VITALS
SYSTOLIC BLOOD PRESSURE: 148 MMHG | HEART RATE: 69 BPM | OXYGEN SATURATION: 98 % | RESPIRATION RATE: 18 BRPM | DIASTOLIC BLOOD PRESSURE: 91 MMHG | WEIGHT: 260 LBS | HEIGHT: 70 IN | TEMPERATURE: 98 F | BODY MASS INDEX: 37.22 KG/M2

## 2025-07-19 LAB
ABSOLUTE EOSINOPHIL (SMH): 0.15 K/UL
ABSOLUTE MONOCYTE (SMH): 0.59 K/UL (ref 0.3–1)
ABSOLUTE NEUTROPHIL COUNT (SMH): 2.8 K/UL (ref 1.8–7.7)
ALBUMIN SERPL-MCNC: 4 G/DL (ref 3.5–5.2)
ALP SERPL-CCNC: 142 UNIT/L (ref 55–135)
ALT SERPL-CCNC: 39 UNIT/L (ref 10–44)
ANION GAP (SMH): 5 MMOL/L (ref 8–16)
AST SERPL-CCNC: 50 UNIT/L (ref 10–40)
BASOPHILS # BLD AUTO: 0.02 K/UL
BASOPHILS NFR BLD AUTO: 0.3 %
BILIRUB SERPL-MCNC: 0.3 MG/DL (ref 0.1–1)
BNP SERPL-MCNC: 10 PG/ML
BUN SERPL-MCNC: 19 MG/DL (ref 6–20)
CALCIUM SERPL-MCNC: 9.2 MG/DL (ref 8.7–10.5)
CHLORIDE SERPL-SCNC: 110 MMOL/L (ref 95–110)
CO2 SERPL-SCNC: 27 MMOL/L (ref 23–29)
CREAT SERPL-MCNC: 1.1 MG/DL (ref 0.5–1.4)
ERYTHROCYTE [DISTWIDTH] IN BLOOD BY AUTOMATED COUNT: 16.7 % (ref 11.5–14.5)
GFR SERPLBLD CREATININE-BSD FMLA CKD-EPI: >60 ML/MIN/1.73/M2
GLUCOSE SERPL-MCNC: 72 MG/DL (ref 70–110)
HCT VFR BLD AUTO: 36 % (ref 37–48.5)
HCV AB SERPL QL IA: NORMAL
HGB BLD-MCNC: 11.2 GM/DL (ref 12–16)
IMM GRANULOCYTES # BLD AUTO: 0.02 K/UL (ref 0–0.04)
IMM GRANULOCYTES NFR BLD AUTO: 0.3 % (ref 0–0.5)
INFLUENZA A MOLECULAR (OHS): NEGATIVE
INFLUENZA B MOLECULAR (OHS): NEGATIVE
LYMPHOCYTES # BLD AUTO: 2.78 K/UL (ref 1–4.8)
MAGNESIUM SERPL-MCNC: 1.9 MG/DL (ref 1.6–2.6)
MCH RBC QN AUTO: 26.8 PG (ref 27–31)
MCHC RBC AUTO-ENTMCNC: 31.1 G/DL (ref 32–36)
MCV RBC AUTO: 86 FL (ref 82–98)
NUCLEATED RBC (/100WBC) (SMH): 0 /100 WBC
PLATELET # BLD AUTO: 263 K/UL (ref 150–450)
PMV BLD AUTO: 9.8 FL (ref 9.2–12.9)
POTASSIUM SERPL-SCNC: 3.9 MMOL/L (ref 3.5–5.1)
PROT SERPL-MCNC: 7.3 GM/DL (ref 6–8.4)
RBC # BLD AUTO: 4.18 M/UL (ref 4–5.4)
RELATIVE EOSINOPHIL (SMH): 2.4 % (ref 0–8)
RELATIVE LYMPHOCYTE (SMH): 43.8 % (ref 18–48)
RELATIVE MONOCYTE (SMH): 9.3 % (ref 4–15)
RELATIVE NEUTROPHIL (SMH): 43.9 % (ref 38–73)
SARS-COV-2 RDRP RESP QL NAA+PROBE: NEGATIVE
SODIUM SERPL-SCNC: 142 MMOL/L (ref 136–145)
TROPONIN HIGH SENSITIVE (SMH): 5 PG/ML
TSH SERPL-ACNC: 1.9 UIU/ML (ref 0.34–5.6)
WBC # BLD AUTO: 6.35 K/UL (ref 3.9–12.7)

## 2025-07-19 PROCEDURE — 93010 ELECTROCARDIOGRAM REPORT: CPT | Mod: ,,, | Performed by: INTERNAL MEDICINE

## 2025-07-19 PROCEDURE — 86803 HEPATITIS C AB TEST: CPT | Performed by: EMERGENCY MEDICINE

## 2025-07-19 PROCEDURE — 87502 INFLUENZA DNA AMP PROBE: CPT | Performed by: EMERGENCY MEDICINE

## 2025-07-19 PROCEDURE — 96375 TX/PRO/DX INJ NEW DRUG ADDON: CPT

## 2025-07-19 PROCEDURE — 94761 N-INVAS EAR/PLS OXIMETRY MLT: CPT

## 2025-07-19 PROCEDURE — 85025 COMPLETE CBC W/AUTO DIFF WBC: CPT | Performed by: EMERGENCY MEDICINE

## 2025-07-19 PROCEDURE — 83735 ASSAY OF MAGNESIUM: CPT | Performed by: EMERGENCY MEDICINE

## 2025-07-19 PROCEDURE — 84484 ASSAY OF TROPONIN QUANT: CPT | Performed by: EMERGENCY MEDICINE

## 2025-07-19 PROCEDURE — 93005 ELECTROCARDIOGRAM TRACING: CPT | Performed by: INTERNAL MEDICINE

## 2025-07-19 PROCEDURE — 84443 ASSAY THYROID STIM HORMONE: CPT | Performed by: EMERGENCY MEDICINE

## 2025-07-19 PROCEDURE — 63600175 PHARM REV CODE 636 W HCPCS: Performed by: EMERGENCY MEDICINE

## 2025-07-19 PROCEDURE — 80053 COMPREHEN METABOLIC PANEL: CPT | Performed by: EMERGENCY MEDICINE

## 2025-07-19 PROCEDURE — 96374 THER/PROPH/DIAG INJ IV PUSH: CPT

## 2025-07-19 PROCEDURE — 83880 ASSAY OF NATRIURETIC PEPTIDE: CPT | Performed by: EMERGENCY MEDICINE

## 2025-07-19 PROCEDURE — U0002 COVID-19 LAB TEST NON-CDC: HCPCS | Performed by: EMERGENCY MEDICINE

## 2025-07-19 RX ORDER — TRIAMCINOLONE ACETONIDE 1 MG/G
1 OINTMENT TOPICAL 2 TIMES DAILY
COMMUNITY

## 2025-07-19 RX ORDER — KETOROLAC TROMETHAMINE 30 MG/ML
15 INJECTION, SOLUTION INTRAMUSCULAR; INTRAVENOUS
Status: COMPLETED | OUTPATIENT
Start: 2025-07-19 | End: 2025-07-19

## 2025-07-19 RX ORDER — PAROXETINE 10 MG/1
1 TABLET, FILM COATED ORAL EVERY MORNING
COMMUNITY
Start: 2025-02-06 | End: 2026-02-06

## 2025-07-19 RX ORDER — TRAZODONE HYDROCHLORIDE 50 MG/1
50 TABLET ORAL NIGHTLY
COMMUNITY
Start: 2025-03-25

## 2025-07-19 RX ORDER — KETOROLAC TROMETHAMINE 10 MG/1
10 TABLET, FILM COATED ORAL EVERY 6 HOURS
Qty: 20 TABLET | Refills: 0 | Status: SHIPPED | OUTPATIENT
Start: 2025-07-19 | End: 2025-07-24

## 2025-07-19 RX ORDER — HYDROCHLOROTHIAZIDE 12.5 MG/1
12.5 CAPSULE ORAL DAILY
COMMUNITY
Start: 2025-05-07 | End: 2026-05-07

## 2025-07-19 RX ORDER — ONDANSETRON HYDROCHLORIDE 2 MG/ML
4 INJECTION, SOLUTION INTRAVENOUS
Status: COMPLETED | OUTPATIENT
Start: 2025-07-19 | End: 2025-07-19

## 2025-07-19 RX ADMIN — ONDANSETRON 4 MG: 2 INJECTION INTRAMUSCULAR; INTRAVENOUS at 01:07

## 2025-07-19 RX ADMIN — KETOROLAC TROMETHAMINE 15 MG: 30 INJECTION, SOLUTION INTRAMUSCULAR; INTRAVENOUS at 01:07

## 2025-07-19 NOTE — ED PROVIDER NOTES
Encounter Date: 2025       History     Chief Complaint   Patient presents with    Headache     Began 1 week ago, has been coming and going, more constant for last 2 days. Hx of HTN, states compliance with meds except today.    Fatigue     For past week, sleeping a lot    Joint Swelling     Bilateral ankles for last week.     Emergent evaluation of a 44-year-old female history of HIV reports compliance with her medications and undetectable viral load, that has well as hypertension presents to the ER due to 1 week of generalized fatigue reports she has been sleeping more during the day and bilateral ankle swelling without pain.  Several days ago she began having a headache and reports that has not usually get headaches.  She reports that has bilateral temporal pressure-like has been intermittent and more constant in the past 2 days with sensitivity to light and sound that has well as nausea and intermittent blurry vision.  She reports hypertension normally takes her blood pressure meds that he would not take them today blood pressure was 151/104 no numbness tingling or weakness in the extremities no facial droop slurred speech or confusion no fever chills sweats no neck stiffness  She reports no URI symptoms      Review of patient's allergies indicates:   Allergen Reactions    Tramadol     Iodinated contrast media Hives and Itching     Past Medical History:   Diagnosis Date    AIDS     HIV (human immunodeficiency virus infection)      Past Surgical History:   Procedure Laterality Date     SECTION      ESOPHAGOGASTRODUODENOSCOPY N/A 2022    Procedure: ESOPHAGOGASTRODUODENOSCOPY (EGD);  Surgeon: Zahira Jiang MD;  Location: [facility];  Service: Endoscopy;  Laterality: N/A;    HYSTERECTOMY       No family history on file.  Social History[1]  Review of Systems   Constitutional:  Positive for fatigue. Negative for activity change, appetite change, chills, diaphoresis and fever.   HENT:  Negative for  congestion, postnasal drip, rhinorrhea and sore throat.    Eyes:  Positive for photophobia and visual disturbance.   Respiratory:  Negative for cough, chest tightness, shortness of breath, wheezing and stridor.    Cardiovascular:  Positive for leg swelling. Negative for chest pain and palpitations.   Gastrointestinal:  Negative for abdominal distention, abdominal pain, blood in stool, constipation, diarrhea, nausea and vomiting.   Genitourinary:  Negative for dysuria, flank pain, frequency, hematuria and urgency.   Musculoskeletal:  Negative for arthralgias, back pain, myalgias, neck pain and neck stiffness.   Skin:  Negative for rash.   Neurological:  Positive for headaches. Negative for dizziness, tremors, seizures, syncope, facial asymmetry, speech difficulty, weakness, light-headedness and numbness.   Hematological:  Does not bruise/bleed easily.   Psychiatric/Behavioral:  Negative for confusion. The patient is not nervous/anxious.    All other systems reviewed and are negative.      Physical Exam     Initial Vitals [07/18/25 2323]   BP Pulse Resp Temp SpO2   (!) 151/104 80 16 98 °F (36.7 °C) 99 %      MAP       --         Physical Exam    Nursing note and vitals reviewed.  Constitutional: She appears well-developed and well-nourished. She is not diaphoretic. No distress.   HENT:   Head: Normocephalic and atraumatic.   Right Ear: External ear normal.   Left Ear: External ear normal.   Nose: Nose normal. Mouth/Throat: Oropharynx is clear and moist.   Eyes: Conjunctivae and EOM are normal. Pupils are equal, round, and reactive to light.   Positive photophobia   Neck: Neck supple. No tracheal deviation present.   Full range motion of the neck   Normal range of motion.  Cardiovascular:  Normal rate, regular rhythm, normal heart sounds and intact distal pulses.     Exam reveals no gallop and no friction rub.       No murmur heard.  151/104 pulse 80   Pulmonary/Chest: Breath sounds normal. No stridor. No respiratory  distress. She has no wheezes. She has no rhonchi. She has no rales. She exhibits no tenderness.   Abdominal: Abdomen is soft. Bowel sounds are normal. She exhibits no distension and no mass. There is no abdominal tenderness. There is no rebound and no guarding.   Musculoskeletal:         General: Edema present. No tenderness. Normal range of motion.      Cervical back: Normal range of motion and neck supple.      Comments: 1+ pitting ankle edema bilaterally no calf tenderness no discoloration     Neurological: She is alert and oriented to person, place, and time. She has normal strength. No cranial nerve deficit or sensory deficit.   Normal gait   Skin: Skin is warm and dry. No rash noted. No erythema. No pallor.   Psychiatric: She has a normal mood and affect. Her behavior is normal. Judgment and thought content normal.         ED Course   Procedures  Labs Reviewed   COMPREHENSIVE METABOLIC PANEL - Abnormal       Result Value    Sodium 142      Potassium 3.9      Chloride 110      CO2 27      Glucose 72      BUN 19      Creatinine 1.1      Calcium 9.2      Protein Total 7.3      Albumin 4.0      Bilirubin Total 0.3       (*)     AST 50 (*)     ALT 39      Anion Gap 5 (*)     eGFR >60     CBC WITH DIFFERENTIAL - Abnormal    WBC 6.35      RBC 4.18      Hgb 11.2 (*)     Hct 36.0 (*)     MCV 86      MCH 26.8 (*)     MCHC 31.1 (*)     RDW 16.7 (*)     Platelet Count 263      MPV 9.8      Nucleated RBC 0      Neut % 43.9      Lymph % 43.8      Mono % 9.3      Eos % 2.4      Basophil % 0.3      Imm Grans % 0.3      Neut # 2.8      Lymph # 2.78      Mono # 0.59      Eos # 0.15      Baso # 0.02      Imm Grans # 0.02     INFLUENZA A & B BY MOLECULAR - Normal    INFLUENZA A MOLECULAR Negative      INFLUENZA B MOLECULAR  Negative     HEPATITIS C ANTIBODY - Normal    Hep C Ab Interp Non-Reactive     MAGNESIUM - Normal    Magnesium 1.9     SARS-COV-2 RNA AMPLIFICATION, QUAL - Normal    SARS COV-2 Molecular Negative      TSH - Normal    TSH 1.901     B-TYPE NATRIURETIC PEPTIDE - Normal    BNP 10     TROPONIN I HIGH SENSITIVITY - Normal    Troponin High Sensitive 5.0     CBC W/ AUTO DIFFERENTIAL    Narrative:     The following orders were created for panel order CBC auto differential.  Procedure                               Abnormality         Status                     ---------                               -----------         ------                     CBC with Differential[[id number]]       Abnormal            Final result                 Please view results for these tests on the individual orders.   HEP C VIRUS HOLD SPECIMEN     EKG Readings: (Independently Interpreted)   I personally interpreted and reviewed EKG sinus rhythm rate 68 no ectopy or conduction delay no ischemic changes     ECG Results              EKG 12-lead (In process)        Collection Time Result Time QRS Duration OHS QTC Calculation    07/19/25 01:12:05 07/19/25 12:37:34 86 421                     In process by Interface, Lab In [facility] (07/19/25 12:37:37)                   Narrative:    Test Reason : R53.83,    Vent. Rate :  68 BPM     Atrial Rate :  68 BPM     P-R Int : 192 ms          QRS Dur :  86 ms      QT Int : 396 ms       P-R-T Axes :  65  25  38 degrees    QTcB Int : 421 ms    Normal sinus rhythm  Normal ECG  When compared with ECG of 29-Jul-2024 20:28,  No significant change was found    Referred By: AAAREFERRAL SELF           Confirmed By:                                   Imaging Results              CT Head Without Contrast (Final result)  Result time 07/19/25 01:50:41      Final result by Garima Gill MD (07/19/25 01:50:41)                   Impression:      No acute findings.      Electronically signed by: Garima Gill  Date:    07/19/2025  Time:    01:50               Narrative:    EXAMINATION:  CT HEAD WITHOUT CONTRAST    CLINICAL HISTORY:  Headache, sudden, severe;HIV pt;    TECHNIQUE:  Low dose axial images were  obtained through the head.  Coronal and sagittal reformations were also performed. Contrast was not administered.    COMPARISON:  None.    FINDINGS:  Intracranial compartment:    Ventricles and sulci are normal in size for age without evidence of hydrocephalus. No extra-axial blood or fluid collections.    The brain parenchyma appears normal. No parenchymal mass, hemorrhage, edema or major vascular distribution infarct.    Skull/extracranial contents (limited evaluation): No fracture. Mastoid air cells and paranasal sinuses are essentially clear.                                       X-Ray Chest AP Portable (Final result)  Result time 07/19/25 01:04:07      Final result by Garima Gill MD (07/19/25 01:04:07)                   Impression:      No acute findings      Electronically signed by: Garima Gill  Date:    07/19/2025  Time:    01:04               Narrative:    EXAMINATION:  XR CHEST AP PORTABLE    CLINICAL HISTORY:  Other fatigue    TECHNIQUE:  Single frontal view of the chest was performed.    COMPARISON:  04/15/2025    FINDINGS:  Lungs are clear. No focal consolidation. No pleural effusion. No pneumothorax. Normal heart size.                                    X-Rays:   Independently Interpreted Readings:   Chest X-Ray: Normal heart size.  No infiltrates.  No acute abnormalities.     Medications   ondansetron injection 4 mg (4 mg Intravenous Given 7/19/25 0102)   ketorolac injection 15 mg (15 mg Intravenous Given 7/19/25 0103)     Medical Decision Making  Emergent evaluation of a 44-year-old female history of HIV reports compliance with her medications and undetectable viral load, that has well as hypertension presents to the ER due to 1 week of generalized fatigue reports she has been sleeping more during the day and bilateral ankle swelling without pain.  Several days ago she began having a headache and reports that has not usually get headaches.  She reports that has bilateral temporal  pressure-like has been intermittent and more constant in the past 2 days with sensitivity to light and sound that has well as nausea and intermittent blurry vision.  She reports hypertension normally takes her blood pressure meds that he would not take them today blood pressure was 151/104 no numbness tingling or weakness in the extremities no facial droop slurred speech or confusion no fever chills sweats no neck stiffness  She reports no URI symptoms  On physical exam vital signs were normal blood pressure mildly elevated 151/104.  Awake alert oriented answering questions appropriately no cranial nerve deficits moving all 4 extremities.  Photophobic pupils equal round reactive no nystagmus normal extraocular movements full range motion of the neck.  Normal cardiac and lung exam soft nontender abdomen normal HEENT exam  [facility]    Patient presents for emergent evaluation of acute fatigue and leg swelling for 1 week headache for several days that poses a threat to life and/or bodily function.   Differential diagnosis includes but was not limited to COVID flu meningitis encephalitis AIDS or HIV related intracranial sinusitis viral syndrome CHF thyroid dysfunction kidney or liver problems dehydration electric made abnormalities.   In the ED patient found to have acute fatigue acute non intractable headache peripheral edema.    I ordered labs and personally reviewed them.  Labs significant for see below  I ordered X-rays and personally reviewed them and reviewed the radiologist interpretation.  Xray significant for see above.    I ordered EKG and personally reviewed it.  EKG significant for see above.    I ordered CT scan and personally reviewed it and reviewed the radiologist interpretation.  CT significant for no acute abnormalities    Discharge MDM     Patient was managed in the ED with IV 4 mg of Zofran 50 mg of Toradol patient reports headache is improved some feels ready to go  The response to treatment was good.     Patient was discharged in stable condition.  Detailed return precautions discussed.  Patient was told to follow up with primary care physician or specialist based on their diagnosis  Natalie Dave MD      Amount and/or Complexity of Data Reviewed  Labs: ordered. Decision-making details documented in ED Course.  Radiology: ordered and independent interpretation performed. Decision-making details documented in ED Course.  ECG/medicine tests: ordered and independent interpretation performed. Decision-making details documented in ED Course.    Risk  Prescription drug management.               ED Course as of 07/20/25 0558   Sat Jul 19, 2025   0214 Flu and COVID negative  Troponin 5  Mag 1.9  BNP 10  TSH 1.901  CMP with alk-phos 142 AST 50  CBC normal white count H&H 11.2 and 36 normal platelets [RM]      ED Course User Index  [RM] Natalie Dave MD                               Clinical Impression:  Final diagnoses:  [R53.83] Fatigue  [R53.83] Fatigue, unspecified type (Primary)  [R51.9] Acute nonintractable headache, unspecified headache type  [R60.0] Peripheral edema          ED Disposition Condition    Discharge Stable          ED Prescriptions       Medication Sig Dispense Start Date End Date Auth. Provider    ketorolac (TORADOL) 10 mg tablet Take 1 tablet (10 mg total) by mouth every 6 (six) hours. for 5 days 20 tablet 7/19/2025 7/24/2025 Natalie Dave MD          Follow-up Information       Follow up With Specialties Details Why Contact Info Additional Information    Margaret Holder MD Internal Medicine Schedule an appointment as soon as possible for a visit  If your symptoms do not improve [address] [facility] [address]  [phone]       [facility]ly [facility] - Emergency Dept Emergency Medicine Go to  If symptoms worsen [address] [place] [facility] [address] [phone] 1st floor                   [1]   Social History  Tobacco Use    Smoking status: Every Day      Current packs/day: 0.25     Types: Cigarettes    Smokeless tobacco: Never   Substance Use Topics    Alcohol use: No    Drug use: Not Currently     Types: IV     Comment: mirror, spoon and syringe, last use 4 yrs ago (patient stated)        Natalie Dave MD  07/20/25 0550

## 2025-07-21 LAB
OHS QRS DURATION: 86 MS
OHS QTC CALCULATION: 421 MS